# Patient Record
Sex: MALE | Race: OTHER | HISPANIC OR LATINO | Employment: FULL TIME | URBAN - METROPOLITAN AREA
[De-identification: names, ages, dates, MRNs, and addresses within clinical notes are randomized per-mention and may not be internally consistent; named-entity substitution may affect disease eponyms.]

---

## 2017-01-09 ENCOUNTER — HOSPITAL ENCOUNTER (EMERGENCY)
Facility: HOSPITAL | Age: 32
Discharge: HOME | End: 2017-01-09
Admitting: EMERGENCY MEDICINE
Payer: COMMERCIAL

## 2017-01-09 VITALS
OXYGEN SATURATION: 97 % | SYSTOLIC BLOOD PRESSURE: 141 MMHG | WEIGHT: 249.12 LBS | TEMPERATURE: 98.1 F | DIASTOLIC BLOOD PRESSURE: 73 MMHG | HEART RATE: 105 BPM | RESPIRATION RATE: 20 BRPM

## 2017-01-09 DIAGNOSIS — J11.1 INFLUENZA-LIKE ILLNESS: Primary | ICD-10-CM

## 2017-01-09 LAB
FLUAV AG SPEC QL IA: NEGATIVE
FLUBV AG SPEC QL IA: NEGATIVE

## 2017-01-09 PROCEDURE — 87798 DETECT AGENT NOS DNA AMP: CPT | Performed by: PHYSICIAN ASSISTANT

## 2017-01-09 PROCEDURE — 99283 EMERGENCY DEPT VISIT LOW MDM: CPT

## 2017-01-09 PROCEDURE — 87400 INFLUENZA A/B EACH AG IA: CPT | Performed by: PHYSICIAN ASSISTANT

## 2017-01-09 RX ORDER — IBUPROFEN 600 MG/1
600 TABLET ORAL ONCE
Status: COMPLETED | OUTPATIENT
Start: 2017-01-09 | End: 2017-01-09

## 2017-01-09 RX ORDER — OSELTAMIVIR PHOSPHATE 75 MG/1
75 CAPSULE ORAL EVERY 12 HOURS SCHEDULED
Qty: 10 CAPSULE | Refills: 0 | Status: SHIPPED | OUTPATIENT
Start: 2017-01-09 | End: 2017-01-14

## 2017-01-09 RX ADMIN — IBUPROFEN 600 MG: 600 TABLET ORAL at 20:38

## 2017-01-10 LAB
FLUAV AG SPEC QL: NORMAL
FLUBV AG SPEC QL: NORMAL
RSV B RNA SPEC QL NAA+PROBE: NORMAL

## 2017-05-06 ENCOUNTER — APPOINTMENT (EMERGENCY)
Dept: RADIOLOGY | Facility: HOSPITAL | Age: 32
End: 2017-05-06
Payer: OTHER MISCELLANEOUS

## 2017-05-06 ENCOUNTER — HOSPITAL ENCOUNTER (EMERGENCY)
Facility: HOSPITAL | Age: 32
Discharge: HOME | End: 2017-05-06
Payer: OTHER MISCELLANEOUS

## 2017-05-06 VITALS
WEIGHT: 260.14 LBS | DIASTOLIC BLOOD PRESSURE: 59 MMHG | HEIGHT: 70 IN | OXYGEN SATURATION: 97 % | HEART RATE: 93 BPM | SYSTOLIC BLOOD PRESSURE: 114 MMHG | TEMPERATURE: 97.7 F | BODY MASS INDEX: 37.24 KG/M2

## 2017-05-06 DIAGNOSIS — S83.91XA RIGHT KNEE SPRAIN: Primary | ICD-10-CM

## 2017-05-06 PROCEDURE — 73564 X-RAY EXAM KNEE 4 OR MORE: CPT

## 2017-05-06 PROCEDURE — 99283 EMERGENCY DEPT VISIT LOW MDM: CPT

## 2017-05-06 RX ORDER — IBUPROFEN 600 MG/1
600 TABLET ORAL EVERY 6 HOURS PRN
Qty: 30 TABLET | Refills: 0 | Status: SHIPPED | OUTPATIENT
Start: 2017-05-06 | End: 2018-01-30

## 2017-05-12 ENCOUNTER — APPOINTMENT (OUTPATIENT)
Dept: OCCUPATIONAL MEDICINE | Facility: CLINIC | Age: 32
End: 2017-05-12
Payer: OTHER MISCELLANEOUS

## 2017-05-12 PROCEDURE — 99213 OFFICE O/P EST LOW 20 MIN: CPT

## 2017-05-19 ENCOUNTER — APPOINTMENT (OUTPATIENT)
Dept: OCCUPATIONAL MEDICINE | Facility: CLINIC | Age: 32
End: 2017-05-19
Payer: OTHER MISCELLANEOUS

## 2017-05-19 PROCEDURE — 99213 OFFICE O/P EST LOW 20 MIN: CPT

## 2017-05-23 ENCOUNTER — APPOINTMENT (OUTPATIENT)
Dept: OCCUPATIONAL MEDICINE | Facility: CLINIC | Age: 32
End: 2017-05-23
Payer: OTHER MISCELLANEOUS

## 2017-05-23 PROCEDURE — 99213 OFFICE O/P EST LOW 20 MIN: CPT

## 2018-01-30 ENCOUNTER — HOSPITAL ENCOUNTER (EMERGENCY)
Facility: HOSPITAL | Age: 33
Discharge: HOME | End: 2018-01-30
Attending: EMERGENCY MEDICINE | Admitting: EMERGENCY MEDICINE
Payer: COMMERCIAL

## 2018-01-30 VITALS
OXYGEN SATURATION: 97 % | HEART RATE: 98 BPM | DIASTOLIC BLOOD PRESSURE: 92 MMHG | TEMPERATURE: 98.5 F | SYSTOLIC BLOOD PRESSURE: 140 MMHG | RESPIRATION RATE: 18 BRPM

## 2018-01-30 DIAGNOSIS — K29.70 GASTRITIS: ICD-10-CM

## 2018-01-30 DIAGNOSIS — R11.10 VOMITING AND DIARRHEA: Primary | ICD-10-CM

## 2018-01-30 DIAGNOSIS — R19.7 VOMITING AND DIARRHEA: Primary | ICD-10-CM

## 2018-01-30 PROCEDURE — 99283 EMERGENCY DEPT VISIT LOW MDM: CPT

## 2018-01-30 RX ORDER — DICYCLOMINE HCL 20 MG
20 TABLET ORAL 2 TIMES DAILY
Qty: 10 TABLET | Refills: 0 | Status: SHIPPED | OUTPATIENT
Start: 2018-01-30 | End: 2021-04-20

## 2018-01-30 RX ORDER — ONDANSETRON 4 MG/1
4 TABLET, ORALLY DISINTEGRATING ORAL EVERY 8 HOURS PRN
Qty: 10 TABLET | Refills: 0 | Status: SHIPPED | OUTPATIENT
Start: 2018-01-30 | End: 2018-02-04 | Stop reason: ALTCHOICE

## 2018-01-30 RX ORDER — MAGNESIUM HYDROXIDE/ALUMINUM HYDROXICE/SIMETHICONE 120; 1200; 1200 MG/30ML; MG/30ML; MG/30ML
30 SUSPENSION ORAL ONCE
Status: COMPLETED | OUTPATIENT
Start: 2018-01-30 | End: 2018-01-30

## 2018-01-30 RX ORDER — FAMOTIDINE 20 MG/1
20 TABLET, FILM COATED ORAL 2 TIMES DAILY
Qty: 20 TABLET | Refills: 0 | Status: SHIPPED | OUTPATIENT
Start: 2018-01-30 | End: 2021-04-20 | Stop reason: ALTCHOICE

## 2018-01-30 RX ORDER — ONDANSETRON 4 MG/1
4 TABLET, ORALLY DISINTEGRATING ORAL ONCE
Status: COMPLETED | OUTPATIENT
Start: 2018-01-30 | End: 2018-01-30

## 2018-01-30 RX ADMIN — ONDANSETRON 4 MG: 4 TABLET, ORALLY DISINTEGRATING ORAL at 17:46

## 2018-01-30 RX ADMIN — LIDOCAINE HYDROCHLORIDE 15 ML: 20 SOLUTION ORAL; TOPICAL at 17:47

## 2018-01-30 RX ADMIN — ALUMINUM HYDROXIDE, MAGNESIUM HYDROXIDE, AND SIMETHICONE 30 ML: 200; 200; 20 SUSPENSION ORAL at 17:47

## 2018-01-30 NOTE — ED PROVIDER NOTES
History  Chief Complaint   Patient presents with    Vomiting     Patient reports multiple episodes of vomiting since yesterday  Also complains of diarrhea and subjective fevers  History provided by:  Patient   used: No    Vomiting   Severity:  Moderate  Duration:  12 hours  Timing:  Intermittent  Quality:  Stomach contents  Able to tolerate:  Liquids  Progression:  Improving  Chronicity:  New  Recent urination:  Normal  Context: not post-tussive and not self-induced    Relieved by:  Nothing  Worsened by:  Nothing  Ineffective treatments:  None tried  Associated symptoms: abdominal pain and diarrhea    Associated symptoms: no chills, no cough, no fever, no headaches and no sore throat    Abdominal pain:     Location:  Epigastric    Quality: cramping      Severity:  Moderate    Onset quality:  Gradual    Duration:  12 hours    Timing:  Intermittent    Progression:  Waxing and waning    Chronicity:  New  Diarrhea:     Quality:  Semi-solid    Severity:  Moderate    Duration:  12 hours    Timing:  Intermittent    Progression:  Improving  Risk factors: suspect food intake    Risk factors: no prior abdominal surgery        None       History reviewed  No pertinent past medical history  Past Surgical History:   Procedure Laterality Date    NO PAST SURGERIES         History reviewed  No pertinent family history  I have reviewed and agree with the history as documented  Social History   Substance Use Topics    Smoking status: Never Smoker    Smokeless tobacco: Not on file    Alcohol use No        Review of Systems   Constitutional: Negative for activity change, chills and fever  HENT: Negative for facial swelling, sore throat and trouble swallowing  Eyes: Negative for pain and visual disturbance  Respiratory: Negative for cough, chest tightness and shortness of breath  Cardiovascular: Negative for chest pain and leg swelling     Gastrointestinal: Positive for abdominal pain, diarrhea and vomiting  Negative for blood in stool and nausea  Genitourinary: Negative for dysuria and flank pain  Musculoskeletal: Negative for back pain, neck pain and neck stiffness  Skin: Negative for pallor and rash  Allergic/Immunologic: Negative for environmental allergies and immunocompromised state  Neurological: Negative for dizziness and headaches  Hematological: Negative for adenopathy  Does not bruise/bleed easily  Psychiatric/Behavioral: Negative for agitation and behavioral problems  All other systems reviewed and are negative  Physical Exam  ED Triage Vitals   Temperature Pulse Respirations Blood Pressure SpO2   01/30/18 1638 01/30/18 1638 01/30/18 1638 01/30/18 1638 01/30/18 1638   98 5 °F (36 9 °C) (!) 115 18 144/64 96 %      Temp Source Heart Rate Source Patient Position - Orthostatic VS BP Location FiO2 (%)   01/30/18 1638 01/30/18 1638 01/30/18 1638 01/30/18 1751 --   Oral Monitor Sitting Right arm       Pain Score       01/30/18 1638       8           Orthostatic Vital Signs  Vitals:    01/30/18 1638 01/30/18 1751   BP: 144/64 140/92   Pulse: (!) 115 98   Patient Position - Orthostatic VS: Sitting Lying       Physical Exam   Constitutional: He is oriented to person, place, and time  He appears well-developed and well-nourished  No distress  HENT:   Head: Normocephalic and atraumatic  Eyes: EOM are normal    Neck: Normal range of motion  Neck supple  Cardiovascular: Normal rate, regular rhythm, normal heart sounds and intact distal pulses  Pulmonary/Chest: Effort normal and breath sounds normal    Abdominal: Soft  Bowel sounds are normal  There is tenderness (mild epigastrium)  There is no rebound and no guarding  Musculoskeletal: Normal range of motion  Neurological: He is alert and oriented to person, place, and time  Skin: Skin is warm and dry  Psychiatric: He has a normal mood and affect  Nursing note and vitals reviewed        ED Medications  Medications   ondansetron (ZOFRAN-ODT) dispersible tablet 4 mg (4 mg Oral Given 1/30/18 1746)   aluminum-magnesium hydroxide-simethicone (MYLANTA) 200-200-20 mg/5 mL oral suspension 30 mL (30 mL Oral Given 1/30/18 1747)   lidocaine viscous (XYLOCAINE) 2 % mucosal solution 15 mL (15 mL Swish & Swallow Given 1/30/18 1747)       Diagnostic Studies  Results Reviewed     None                 No orders to display              Procedures  Procedures       Phone Contacts  ED Phone Contact    ED Course  ED Course                                MDM  Number of Diagnoses or Management Options  Gastritis: new and does not require workup  Vomiting and diarrhea: new and does not require workup  Diagnosis management comments: Patient is a 54-year-old healthy male, comes in with complaints of vomiting, diarrhea, epigastric pain, started around 4:00 a m  today, eat rice and last night, thinks that it might have caused the symptoms; denies fever, blood in stool or vomiting, no chest pain or dyspnea  Past history of GERD  No previous surgeries  Patient is alert, oriented, well-appearing, hemodynamically stable vital signs noted; Lungs clear to auscultation bilaterally; Cardiovascular normal heart sounds:  Normal, equal pulses bilaterally; Abdomen: soft, mild tenderness epigastrium, nondistended, bowel sounds present; Neuro: normal cranial nerve, motor and sensory exam, no focal deficits; no neck stiffness; Extremities: no calf swelling or tenderness, neurovascular intact distally  Impression:  Gastritis, GERD, viral GI illness  Patient is hemodynamically stable, well-appearing, did offer IV medications, but wants to try oral meds, will give Zofran ODT, Maalox, lidocaine, p o  trial, discharge on Pepcid and Zofran and bentyl      CritCare Time    Disposition  Final diagnoses:   Vomiting and diarrhea   Gastritis     Time reflects when diagnosis was documented in both MDM as applicable and the Disposition within this note Time User Action Codes Description Comment    1/30/2018  5:47 PM Danielleakhil Andrzej Add [R11 10,  R19 7] Vomiting and diarrhea     1/30/2018  5:48 PM Zoe Donnelly Add [K29 70] Gastritis       ED Disposition     ED Disposition Condition Comment    Discharge  [facility] discharge to home/self care  Condition at discharge: Stable        Follow-up Information     Follow up With Specialties Details Why Contact Info    Anjum Fontanez MD    [address]  [facility] [address]  [phone]          Discharge Medication List as of 1/30/2018  5:49 PM      START taking these medications    Details   dicyclomine (BENTYL) 20 mg tablet Take 1 tablet (20 mg total) by mouth 2 (two) times a day, Starting Tue 1/30/2018, Print      famotidine (PEPCID) 20 mg tablet Take 1 tablet (20 mg total) by mouth 2 (two) times a day for 10 days, Starting Tue 1/30/2018, Until Fri 2/9/2018, Print      ondansetron (ZOFRAN-ODT) 4 mg disintegrating tablet Take 1 tablet (4 mg total) by mouth every 8 (eight) hours as needed for nausea or vomiting for up to 5 days, Starting Tue 1/30/2018, Until Sun 2/4/2018, Print           No discharge procedures on file      ED Provider  Electronically Signed by           Clifton Stovall MD  01/31/18 8893

## 2018-01-30 NOTE — DISCHARGE INSTRUCTIONS

## 2018-07-14 PROBLEM — Z98.890 H/O RIGHT KNEE SURGERY: Status: ACTIVE | Noted: 2018-07-14
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
## 2018-07-16 ENCOUNTER — OFFICE VISIT (OUTPATIENT)
Dept: FAMILY MEDICINE CLINIC | Facility: CLINIC | Age: 33
End: 2018-07-16
Payer: COMMERCIAL

## 2018-07-16 VITALS
BODY MASS INDEX: 38.8 KG/M2 | HEIGHT: 70 IN | RESPIRATION RATE: 16 BRPM | DIASTOLIC BLOOD PRESSURE: 80 MMHG | HEART RATE: 93 BPM | TEMPERATURE: 96.8 F | SYSTOLIC BLOOD PRESSURE: 126 MMHG | WEIGHT: 271 LBS | OXYGEN SATURATION: 97 %

## 2018-07-16 DIAGNOSIS — N52.9 ERECTILE DYSFUNCTION, UNSPECIFIED ERECTILE DYSFUNCTION TYPE: ICD-10-CM

## 2018-07-16 DIAGNOSIS — M54.50 CHRONIC MIDLINE LOW BACK PAIN WITHOUT SCIATICA: ICD-10-CM

## 2018-07-16 DIAGNOSIS — M25.561 CHRONIC PAIN OF RIGHT KNEE: Primary | ICD-10-CM

## 2018-07-16 DIAGNOSIS — G89.29 CHRONIC MIDLINE LOW BACK PAIN WITHOUT SCIATICA: ICD-10-CM

## 2018-07-16 DIAGNOSIS — G89.29 CHRONIC PAIN OF RIGHT KNEE: Primary | ICD-10-CM

## 2018-07-16 PROBLEM — E66.9 OBESITY (BMI 35.0-39.9 WITHOUT COMORBIDITY): Status: ACTIVE | Noted: 2018-07-16

## 2018-07-16 PROCEDURE — 99214 OFFICE O/P EST MOD 30 MIN: CPT | Performed by: INTERNAL MEDICINE

## 2018-07-16 RX ORDER — NAPROXEN 500 MG/1
500 TABLET ORAL 2 TIMES DAILY WITH MEALS
Qty: 60 TABLET | Refills: 1 | Status: SHIPPED | OUTPATIENT
Start: 2018-07-16 | End: 2021-04-20 | Stop reason: ALTCHOICE

## 2018-07-16 RX ORDER — METHYLPREDNISOLONE 4 MG/1
TABLET ORAL
Qty: 21 TABLET | Refills: 0 | Status: SHIPPED | OUTPATIENT
Start: 2018-07-16 | End: 2021-04-20 | Stop reason: ALTCHOICE

## 2018-07-16 NOTE — ASSESSMENT & PLAN NOTE
Patient counseled on dietary changes that can be implemented for weight loss  Will order fasting lipid levels and HBA1C at the follow up visit

## 2018-07-16 NOTE — ASSESSMENT & PLAN NOTE
Lumbar x-ray ordered  Patient prescribed a course of steroids and advised to take Naproxen as needed

## 2018-07-16 NOTE — PATIENT INSTRUCTIONS
Control del peso   CUIDADO AMBULATORIO:   Por qué es importante controlar gonzalez peso:  Hyla Ale sobrepeso aumenta gonzalez riesgo de presentar problemas de man john enfermedades del corazón, hipertensión, diabetes tipo 2 y ciertos tipos de cáncer  También puede aumentar gonzalez riesgo de presentar osteoartritis, apnea del sueño y otros problemas respiratorios  Trate de bajar de peso de forma gradual y [place]  Incluso ric mínima pérdida de peso puede disminuir gonzalez riesgo de problemas de [place]  Cómo bajar de peso de Carie Lint forma munoz:  Franci Najjar forma munoz y saludable para perder peso es consumir menos calorías y realizar ric actividad física en forma regular  Usted puede perder hasta 1 yesika por semana al reducir el consumo de 500 calorías cada día  Usted puede reducir el consumo de calorías al comer porciones más pequeñas o eliminar los alimentos con alto contenido de calorías  Coral las etiquetas para determinar la cantidad de calorías que contienen los alimentos que consume  También puede quemar calorías al realizar ejercicio john caminar, nadar o montar en bicicleta  Es más probable que usted [employer] peso si hace de estos cambios parte de gonzalez estilo de lucy  Plan de alimentación saludable para el control del peso:  Un plan de alimentación saludable incluye ric variedad de alimentos, contiene más pocas calorías y lo ayuda a estar saludable  Un plan de alimentación saludable incluye lo siguiente:  · Consuma alimentos de grano integral con más frecuencia  Un plan de alimentación saludable debe contener alimentos con fibra  La fibra es la parte de las frutas, verduras y granos que gonzalez cuerpo no puede digerir  Los alimentos de granos integrales son saludables y suministran fibra adicional a gonzalez Nilay [place]  Algunos ejemplos de alimentos de granos integrales son los panes integrales, pastas integrales, la renee, el arroz integral y anne de bulgur  · Consuma ric variedad de verduras todos los ra    [address] las espinacas, coliflor, col ronald y [place]  Coma verduras anaranjadas john las zanahorias, nayeli dulces y calabaza de invierno  · Consuma ric variedad de frutas todos los ra  Escoja frutas frescas o enlatadas en gonzalez propio jugo o con jugo bajo en [place] en vez de jugo  El Tajikistranjit de frutas tiene [place] o kurt nada de [place]  · Consuma productos lácteos con bajo contenido de [place]  Reyes Católicos 85 de 1%  Consuma yogur descremado y requesón (cottage) semidescremado  Trate de consumir quesos descremados john el queso mozzarela y otros quesos semidescremado  · Seleccione don y otros alimentos con proteínas bajos en grasa  Escoja frijoles u [address]  Seleccione pescado, carne de aves sin piel (john el rubia o [place]), cosby de carne [place] (de res o de cerdo)  Antes de cocinar las don o las aves maddy cualquier parte de grasa visible  · Use menos grasas y aceites  Trate de hornear los alimentos en lugar de freírlos  Agregue a las [address] [facility], john [place], crema [place], condimentos para [place] y [facility]  Consuma menos alimentos de alto tenor graso  Coma menos alimentos altos en grasa john las nayeli fritas, donas, helados y pasteles  · Consuma menos dulces  Limite los alimentos y las bebidas con un gran contenido de azúcar  Estos incluyen los caramelos, galletas, gaseosas normales y bebidas endulzadas  Formas de reducir las calorías:   · [address] [facility] porciones  ¨ Use platos pequeños para servirse porciones pequeñas  ¨ No coma segundas porciones  ¨ Cuando coma en un restaurante, pida ric caja y guarde en oswaldo la mitad de la comida antes de empezar a comer  ¨ Comparta con alguien un plato de entrada  · Reemplace los bocadillos o meriendas altos en calorías por los saludables de menos calorías       ¨ Escoja frutas frescas, verduras, galletas de arroz bajo en grasa o palomitas de maíz en lugar de comer nayeli fritas de paquete, nueces o dulces de chocolate  ¨ [place] agua o bebidas dietéticas en lugar de las endulzadas  · Coma kaela comidas regularmente  No omita ninguna comida porque esto puede conducir a comer más a ric hora más tarde del día  Si no tiene tiempo para hacer comidas regulares, consuma un refrigerio saludable  · No vaya al ayala cuando tenga hambre  Usted podría ser más propenso a elegir alimentos no saludables  Lleve ric lista de alimentos saludables y vaya de compras después de brian comido  Ejercicio:  Realice ric actividad física por lo menos 30 minutos al día, la mayoría de los días de la [place]  Algunos de los ejercicios incluyen caminar, montar en bicicleta, bailar y nadar  Usted también puede realizar más actividad física usando las escaleras en vez de los ascensores o estacionarse más lejos cuando Cleta Coon a las tiendas  Pregunte a yost médico acerca del mejor plan de ejercicio para usted  Otras cosas que debe tener en cuenta mientras trata de bajar de peso:   · Esté consciente de las situaciones que podrían ocasionarle ganas de comer en exceso, john el comer mientras michelle la televisión  Busque formas para evitar estas situaciones  Por ejemplo, leer un libro, caminar o hacer trabajos manuales  · Reúnase con un david de apoyo o con personas que también están tratando de bajar de [place]  Young le puede ayudar a mantenerse motivado y continuar progresando en yost objetivo de perder peso  © 2017 [address] Information is for End User's use only and may not be sold, redistributed or otherwise used for commercial purposes  All illustrations and images included in CareNotes® are the copyrighted property of A D A M , Inc  or Jayy Guzman  Esta información es sólo para uso en educación  Yost intención no es darle un consejo médico sobre enfermedades o tratamientos   Colsulte con yost Chago Ahle farmacéutico antes de seguir cualquier régimen médico para saber si es seguro y efectivo para usted  Ejercicios para fortalecer los músculos del tronco   CUIDADO AMBULATORIO:   Lo que debe saber sobre los ejercicios para fortalecer los músculos del tronco:  El tronco incluye los músculos de la parte baja de la espalda, la cadera, la pelvis y el abdomen  Los ejercicios para fortalecer los músculos del tronco ayudan a sanar y fortalecer estos músculos  Aguilar ayuda a prevenir otra lesión y [place] pelvis, la columna vertebral y la cadera en la posición correcta  Pregúntele a gonzalez [place] vitaminas y minerales son adecuados para usted  · Tiene dolor mckenna o creciente cuando hace ejercico o está en reposo  · Tiene preguntas o inquietudes acerca de los ejercicios de hombros   Consejos de seguridad:  Consulte a gonzalez médico antes de empezar un régimen de ejercicios  Un fisioterapeuta puede enseñarle a hacer ejercicios para fortalecer los músculos del tronco de forma munoz  · [employer] ejercicios sobre ric colchoneta o superficie firme  Ric superficie sostendrá la columna vertebral y evitará el dolor de espalda  No jerald estos ejercicios en ric cama  · Muévase lenta y suavemente  Evite movimientos rápidos o bruscos  · Deténgase si siente dolor  Los ejercicios para fortalecer los músculos del tronco no deben ser dolorosos  Deténgase si siente dolor  · Respire normalmente weston los ejercicios  No contenga la respiración  Aguilar puede aumentar la presión arterial y evitar el fortalecimiento muscular  Gonzalez médico le dirá cuándo inhalar y exhalar weston el ejercicio  · Comience todos kaela ejercicios con tonificación abdominal   La tonificación abdominal ayuda a calentar los músculos del tronco  Usted también puede practicar la tonificación o fortalecimiento abdominal weston todo el día mientras está sentado o de pie  Acuéstese boca arriba con kaela rodillas dobladas y kaela pies planos sobre el piso  Coloque kaela brazos en ric posición relajada junto a gonzalez cuerpo   Ponga tensos kaela músculos abdominales  Contraiga el ombligo hacia adentro en dirección a la columna  Sostenga está posición por 5 segundos  Relaje kaela músculos  Repita 10 veces  Ejercicios para fortalecer los músculos del tronco:  Gonzalez médico le indicará con que frecuencia hacer estos ejercicios  También le dirá cuántas repeticiones de cada ejercicio debe hacer  Reuben cada ejercicio weston 5 segundos o según lo indicado  A medida que se fortalezca, aumente a 10 a 15 segundos  Puede hacer algunos de estos ejercicios sobre ric pelota de estabilidad o con un peso  Pregunte a gonzalez médico cómo utilizar ric pelota de estabilidad o un peso para estos ejercicios:  · Vivas lateral con pierna doblada:  Acuéstese de lado con kaela piernas, caderas y hombros en línea recta  Apóyese en gonzalez antebrazo a fin de que gonzalez codo esté directamente debajo de gonzalez hombro  Doble las rodillas hasta 90°  Levante las caderas del suelo  Balancéese sobre el antebrazo y sobre el lado de la rodilla  Mantenga esta posición  Repita en el otro lado  · Vivas lateral con pierna estirada:  Acuéstese de lado con kaela piernas, caderas y hombros en línea recta  Apóyese en gonzalez antebrazo a fin de que gonzalez codo esté directamente debajo de gonzalez hombro  Puede apoyar la pierna de arriba frente a la pierna de abajo para el apoyo  Levante las caderas del suelo  Balancéese sobre el antebrazo y sobre la parte externa del pie flexionado  No permita que gonzalez tobillo se doble de lado  Mantenga esta posición  Repita en el otro lado  · Superman:  Acuéstese boca abajo  Extienda los brazos hacia adelante en el piso  Apriete los músculos abdominales y levante la mano derecha y la pierna izquierda del suelo  Mantenga esta posición  Despacio regrese a la posición inicial  Apriete los músculos abdominales y levante la mano izquierda y la pierna derecha del suelo  Mantenga esta posición   Despacio regrese a la posición inicial            · Posición de acurrucarse:  Acuéstese boca arriba con orly rodillas dobladas y orly pies planos sobre el piso  Coloque las leonor con las alexsandra hacia abajo por debajo de la parte baja de gonzalez espalda  Después, con los codos [facility] [employer], levante los hombros y el pecho de 2 a 3 pulgadas del piso  Mantenga gonzalez alvin a la misma altura de orly hombros  Mantenga esta posición  Despacio regrese a la posición inicial            · Levantamiento de la pierna estirada:  Acuéstese boca arriba con ric pierna estirada  Doble la otra rodilla y coloque el pie en posición plana sobre el piso  Ponga tensos orly músculos abdominales  Mantenga la pierna recta y levántela lentamente de 6 a 12 pulgadas del piso  Mantenga esta posición  Baje gonzalez pierna lentamente  Reuben tantas repeticiones john le indicaron de anton lado  Repita el ejercicio con la otra pierna  · Tablón:  Acuéstese boca abajo  Doble los codos y [employer] antebrazos en posición plana sobre el suelo  Levante gonzalez pecho, el estómago y las rodillas del suelo  Asegúrese de [facility] [employer] codos estén por debajo de los hombros  Gonzalez cuerpo debe estar en línea recta  No deje que las caderas o la parte baja de la espalda se hundan hacia el suelo  Contraiga los músculos abdominales y sostenga weston 15 segundos  Para hacer anton ejercicio más difícil, sostenga weston 30 segundos o levante 1 pierna a la vez  · Bicicletas:  Acuéstese boca arriba  Doble ambas rodillas y llévelas hacia gonzalez pecho  Orly pantorrillas deben estar paralelas al [employer]  [address] [facility] en la parte posterior de la alvin  Estire la pierna derecha y manténgala levantada 2 pulgadas del piso  Levante la alvin y los hombros del piso y gire hacia la izquierda  Mantenga la alvin y los hombros levantados  Doble la rodilla derecha mientras endereza la pierna izquierda  Mantenga la pierna izquierda 2 pulgadas sobre el piso  Gire la alvin y el pecho hacia la pierna izquierda  Siga enderezando 1 pierna a la vez y gire         Crystal Son a orly consultas de control con yost médico según le indicaron  Anote kaela preguntas para que se acuerde de hacerlas weston kaela visitas  © 2017 [address] Information is for End User's use only and may not be sold, redistributed or otherwise used for commercial purposes  All illustrations and images included in CareNotes® are the copyrighted property of A D A M , Inc  or Jayy Guzman  Esta información es sólo para uso en educación  Yost intención no es darle un consejo médico sobre enfermedades o tratamientos  Colsulte con yost Arnoldo Carmine farmacéutico antes de seguir cualquier régimen médico para saber si es seguro y efectivo para usted

## 2018-07-16 NOTE — LETTER
July 16, 2018     Patient: Angelo Farias   YOB: 1985   Date of Visit: 7/16/2018       To Whom it May Concern:    Angelo Farias is under my professional care  He was seen in my office on 7/16/2018  He may return to work on 07/17/2018  If you have any questions or concerns, please don't hesitate to call           Sincerely,          Casey Sims MD        CC: No Recipients

## 2018-07-23 ENCOUNTER — TRANSCRIBE ORDERS (OUTPATIENT)
Dept: ADMINISTRATIVE | Facility: HOSPITAL | Age: 33
End: 2018-07-23

## 2018-07-23 ENCOUNTER — TELEPHONE (OUTPATIENT)
Dept: FAMILY MEDICINE CLINIC | Facility: CLINIC | Age: 33
End: 2018-07-23

## 2018-07-24 ENCOUNTER — TELEPHONE (OUTPATIENT)
Dept: FAMILY MEDICINE CLINIC | Facility: CLINIC | Age: 33
End: 2018-07-24

## 2018-07-24 ENCOUNTER — HOSPITAL ENCOUNTER (OUTPATIENT)
Dept: RADIOLOGY | Facility: HOSPITAL | Age: 33
Discharge: HOME | End: 2018-07-24
Payer: COMMERCIAL

## 2018-07-24 DIAGNOSIS — M54.50 CHRONIC MIDLINE LOW BACK PAIN WITHOUT SCIATICA: ICD-10-CM

## 2018-07-24 DIAGNOSIS — G89.29 CHRONIC MIDLINE LOW BACK PAIN WITHOUT SCIATICA: ICD-10-CM

## 2018-07-24 PROCEDURE — 72110 X-RAY EXAM L-2 SPINE 4/>VWS: CPT

## 2018-08-05 ENCOUNTER — APPOINTMENT (EMERGENCY)
Dept: RADIOLOGY | Facility: HOSPITAL | Age: 33
End: 2018-08-05
Payer: OTHER MISCELLANEOUS

## 2018-08-05 ENCOUNTER — HOSPITAL ENCOUNTER (EMERGENCY)
Facility: HOSPITAL | Age: 33
Discharge: HOME | End: 2018-08-05
Admitting: EMERGENCY MEDICINE
Payer: OTHER MISCELLANEOUS

## 2018-08-05 VITALS
TEMPERATURE: 98.3 F | WEIGHT: 268.5 LBS | RESPIRATION RATE: 18 BRPM | SYSTOLIC BLOOD PRESSURE: 130 MMHG | DIASTOLIC BLOOD PRESSURE: 68 MMHG | OXYGEN SATURATION: 97 % | BODY MASS INDEX: 38.53 KG/M2 | HEART RATE: 89 BPM

## 2018-08-05 DIAGNOSIS — M25.561 CHRONIC PAIN OF RIGHT KNEE: ICD-10-CM

## 2018-08-05 DIAGNOSIS — M25.562 KNEE PAIN, LEFT: ICD-10-CM

## 2018-08-05 DIAGNOSIS — M25.461 EFFUSION OF RIGHT KNEE: ICD-10-CM

## 2018-08-05 DIAGNOSIS — Z98.890 H/O RIGHT KNEE SURGERY: Primary | ICD-10-CM

## 2018-08-05 DIAGNOSIS — G89.29 CHRONIC PAIN OF RIGHT KNEE: ICD-10-CM

## 2018-08-05 DIAGNOSIS — E66.9 OBESITY (BMI 35.0-39.9 WITHOUT COMORBIDITY): ICD-10-CM

## 2018-08-05 DIAGNOSIS — M25.561 KNEE PAIN, RIGHT: ICD-10-CM

## 2018-08-05 PROCEDURE — 73562 X-RAY EXAM OF KNEE 3: CPT

## 2018-08-05 PROCEDURE — 99283 EMERGENCY DEPT VISIT LOW MDM: CPT

## 2018-08-05 RX ORDER — IBUPROFEN 400 MG/1
400 TABLET ORAL EVERY 6 HOURS PRN
Qty: 30 TABLET | Refills: 0 | Status: SHIPPED | OUTPATIENT
Start: 2018-08-05 | End: 2021-04-20 | Stop reason: ALTCHOICE

## 2018-08-05 RX ORDER — ACETAMINOPHEN 500 MG
500 TABLET ORAL EVERY 6 HOURS PRN
Qty: 30 TABLET | Refills: 0 | Status: SHIPPED | OUTPATIENT
Start: 2018-08-05 | End: 2021-04-20 | Stop reason: ALTCHOICE
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
## 2018-08-05 NOTE — DISCHARGE INSTRUCTIONS
Arthralgia   WHAT YOU NEED TO KNOW:   Arthralgia is pain in one or more joints, with no inflammation  It may be short-term and get better within 6 to 8 weeks  Arthralgia can be an early sign of arthritis  Arthralgia may be caused by a medical condition, such as a hormone disorder or a tumor  It may also be caused by an infection or injury  DISCHARGE INSTRUCTIONS:   Medicines: The following medicines may  be ordered for you:  · Acetaminophen  decreases pain  Ask how much to take and how often to take it  Follow directions  Acetaminophen can cause liver damage if not taken correctly  · NSAIDs  decrease pain and prevent swelling  Ask your healthcare provider which medicine is right for you  Ask how much to take and when to take it  Take as directed  NSAIDs can cause stomach bleeding and kidney problems if not taken correctly  · Pain relief cream  decreases pain  Use this cream as directed  · Take your medicine as directed  Contact your healthcare provider if you think your medicine is not helping or if you have side effects  Tell him of her if you are allergic to any medicine  Keep a list of the medicines, vitamins, and herbs you take  Include the amounts, and when and why you take them  Bring the list or the pill bottles to follow-up visits  Carry your medicine list with you in case of an emergency  Follow up with your healthcare provider or specialist as directed:  Write down your questions so you remember to ask them during your visits  Self-care:   · Apply heat  to help decrease pain  Use a heating pad or heat wrap  Apply heat for 20 to 30 minutes every 2 hours for as many days as directed  · Rest  as much as possible  Avoid activities that cause joint pain  · Apply ice  to help decrease swelling and pain  Ice may also help prevent tissue damage  Use an ice pack, or put crushed ice in a plastic bag   Cover it with a towel and place it on your painful joint for 15 to 20 minutes every hour or as directed  · Support  the joint with a brace or elastic wrap as directed  · Elevate  your joint above the level of your heart as often as you can to help decrease swelling and pain  Prop your painful joint on pillows or blankets to keep it elevated comfortably  · Lose weight  if you are overweight  Extra weight can put pressure on your joints and cause more pain  Ask your healthcare provider how much you should weigh  Ask him to help you create a weight loss plan  · Exercise  regularly to help improve joint movement and to decrease pain  Ask about the best exercise plan for you  Low-impact exercises can help take the pressure off your joints  Examples are walking, swimming, and water aerobics  Physical therapy:  A physical therapist teaches you exercises to help improve movement and strength, and to decrease pain  Ask your healthcare provider if physical therapy is right for you  Contact your healthcare provider or specialist if:   · You have a fever  · You continue to have joint pain that cannot be relieved with heat, ice, or medicine  · You have pain and inflammation around your joint  · You have questions or concerns about your condition or care  Return to the emergency department if:   · You have sudden, severe pain when you move your joint  · You have a fever and shaking chills  · You cannot move your joint  · You lose feeling on the side of your body where you have the painful joint  © 2017 [address] [employer]  Information is for End User's use only and may not be sold, redistributed or otherwise used for commercial purposes  All illustrations and images included in CareNotes® are the copyrighted property of [employer]  or Jayy Guzman  The above information is an  only  It is not intended as medical advice for individual conditions or treatments   Talk to your doctor, nurse or pharmacist before following any medical regimen to see if it is safe and effective for you

## 2018-08-05 NOTE — ED NOTES
Patient reports pain and numbness in bilateral knees  Pt  reports surgery on his right knee about 1 year ago, due to a fall that happened at work "there was a problem with the meniscus "  Patient states that he takes percocet that is prescribed for the pain  Patient's right knee is larger than the left and he states that this is very swollen for him         Venu Wang RN  08/05/18 9191

## 2018-11-22 ENCOUNTER — HOSPITAL ENCOUNTER (EMERGENCY)
Facility: HOSPITAL | Age: 33
Discharge: HOME | End: 2018-11-22
Attending: EMERGENCY MEDICINE | Admitting: EMERGENCY MEDICINE
Payer: COMMERCIAL

## 2018-11-22 VITALS
RESPIRATION RATE: 18 BRPM | TEMPERATURE: 97.5 F | HEART RATE: 95 BPM | OXYGEN SATURATION: 98 % | DIASTOLIC BLOOD PRESSURE: 74 MMHG | SYSTOLIC BLOOD PRESSURE: 131 MMHG

## 2018-11-22 DIAGNOSIS — R21 RASH AND NONSPECIFIC SKIN ERUPTION: Primary | ICD-10-CM

## 2018-11-22 PROCEDURE — 99282 EMERGENCY DEPT VISIT SF MDM: CPT

## 2018-11-22 RX ORDER — PREDNISONE 20 MG/1
40 TABLET ORAL DAILY
Qty: 10 TABLET | Refills: 0 | Status: SHIPPED | OUTPATIENT
Start: 2018-11-22 | End: 2018-11-22

## 2018-11-22 RX ORDER — PREDNISONE 20 MG/1
40 TABLET ORAL ONCE
Status: COMPLETED | OUTPATIENT
Start: 2018-11-22 | End: 2018-11-22

## 2018-11-22 RX ORDER — PREDNISONE 20 MG/1
40 TABLET ORAL DAILY
Qty: 10 TABLET | Refills: 0 | Status: SHIPPED | OUTPATIENT
Start: 2018-11-22 | End: 2018-11-27

## 2018-11-22 RX ORDER — KETOCONAZOLE 20 MG/G
CREAM TOPICAL DAILY
Qty: 15 G | Refills: 0 | Status: SHIPPED | OUTPATIENT
Start: 2018-11-22 | End: 2021-04-20 | Stop reason: ALTCHOICE

## 2018-11-22 RX ADMIN — PREDNISONE 40 MG: 20 TABLET ORAL at 16:37
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
## 2018-11-22 NOTE — DISCHARGE INSTRUCTIONS
Acute Rash   WHAT YOU NEED TO KNOW:   A rash is irritation, redness, or itchiness in the skin or mucus membranes  Mucus membranes are areas such as the lining of your nose or throat  Acute means the rash starts suddenly, worsens quickly, and lasts a short time  An acute rash may be caused by a disease, such as hepatitis or vasculitis  The rash may be a reaction to something you are allergic to, such as certain foods, or latex  Certain medicines, including antibiotics, NSAIDs, prescription pain medicine, and aspirin can also cause a rash  DISCHARGE INSTRUCTIONS:   Return to the emergency department if:   · You have sudden trouble breathing or chest pain  · You are vomiting, have a headache or muscle aches, and your throat hurts  Contact your healthcare provider if:   · You have a fever  · You get open wounds from scratching your skin, or you have a wound that is red, swollen, or painful  · Your rash lasts longer than 3 months  · You have swelling or pain in your joints  · You have questions or concerns about your condition or care  Medicines:  If your rash does not go away on its own, you may need the following medicines:  · Antihistamines  may be given to help decrease itching  · Steroids  may be given to decrease inflammation  · Antibiotics  help fight or prevent a bacterial infection  · Take your medicine as directed  Contact your healthcare provider if you think your medicine is not helping or if you have side effects  Tell him of her if you are allergic to any medicine  Keep a list of the medicines, vitamins, and herbs you take  Include the amounts, and when and why you take them  Bring the list or the pill bottles to follow-up visits  Carry your medicine list with you in case of an emergency  Prevent a rash or care for your skin when you have a rash:  Dry skin can lead to more problems  Do not scratch your skin if it itches  You may cause a skin infection by scratching   The following may prevent dry skin, and help your skin look better:  · Use thick cream lotions or petroleum jelly to help soothe your rash  These products work well on areas with thick skin, such as your feet  Cool compresses may also be used to soothe your skin  Apply a cool compress or a cool, wet towel, and then cover it with a dry towel  · Use lukewarm water when you bathe  Hot water may damage your skin more  Pat your skin dry  Do not rub your skin with a towel  · Use detergents, soaps, shampoos, and bubble baths made for sensitive skin  Wear clothes that are made of cotton instead of nylon or wool  Cotton is softer, so it will not hurt your skin as much  Follow up with your healthcare provider as directed: You may need to see a dermatologist if healthcare providers do not know what is causing your rash  You may also need to see a dermatologist if your rash does not get better even with treatment  You may need to see a dietitian if you have allergies to foods  Write down your questions so you remember to ask them during your visits  © 2017 [address] [facility] Information is for End User's use only and may not be sold, redistributed or otherwise used for commercial purposes  All illustrations and images included in CareNotes® are the copyrighted property of [employer]  or Jayy Guzman  The above information is an  only  It is not intended as medical advice for individual conditions or treatments  Talk to your doctor, nurse or pharmacist before following any medical regimen to see if it is safe and effective for you  Contact Dermatitis   WHAT YOU NEED TO KNOW:   Contact dermatitis is a skin rash  It develops when you touch something that irritates your skin or causes an allergic reaction  DISCHARGE INSTRUCTIONS:   Call 911 for any of the following:   · You have sudden trouble breathing  · Your throat swells and you have trouble eating      · Your face is swollen  Contact your healthcare provider if:   · You have a fever  · Your blisters are draining pus  · Your rash spreads or does not get better, even after treatment  · You have questions or concerns about your condition or care  Medicines:   · Medicines  help decrease itching and swelling  They will be given as a topical medicine to apply to your rash or as a pill  · Take your medicine as directed  Contact your healthcare provider if you think your medicine is not helping or if you have side effects  Tell him or her if you are allergic to any medicine  Keep a list of the medicines, vitamins, and herbs you take  Include the amounts, and when and why you take them  Bring the list or the pill bottles to follow-up visits  Carry your medicine list with you in case of an emergency  Manage contact dermatitis:   · Take short baths or showers in cool water  Use mild soap or soap-free cleansers  Add oatmeal, baking soda, or cornstarch to the bath water to help decrease skin irritation  · Avoid skin irritants , such as makeup, hair products, soaps, and cleansers  Use products that do not contain perfume or dye  · Apply a cool compress to your rash  This will help soothe your skin  · Keep your skin moist   Rub unscented cream or lotion on your skin to prevent dryness and itching  Do this right after a bath or shower when your skin is still damp  Follow up with your healthcare provider or dermatologist in 2 to 3 days:  Write down your questions so you remember to ask them during your visits  © 2017 [address] [employer] Information is for End User's use only and may not be sold, redistributed or otherwise used for commercial purposes  All illustrations and images included in CareNotes® are the copyrighted property of [employer]  or Jayy Guzman  The above information is an  only  It is not intended as medical advice for individual conditions or treatments   Talk to your doctor, nurse or pharmacist before following any medical regimen to see if it is safe and effective for you

## 2018-12-05 ENCOUNTER — APPOINTMENT (EMERGENCY)
Dept: RADIOLOGY | Facility: HOSPITAL | Age: 33
End: 2018-12-05
Payer: COMMERCIAL

## 2018-12-05 ENCOUNTER — HOSPITAL ENCOUNTER (EMERGENCY)
Facility: HOSPITAL | Age: 33
Discharge: HOME | End: 2018-12-05
Attending: EMERGENCY MEDICINE | Admitting: EMERGENCY MEDICINE
Payer: COMMERCIAL

## 2018-12-05 VITALS
BODY MASS INDEX: 35.87 KG/M2 | TEMPERATURE: 99.8 F | SYSTOLIC BLOOD PRESSURE: 111 MMHG | HEART RATE: 114 BPM | DIASTOLIC BLOOD PRESSURE: 65 MMHG | RESPIRATION RATE: 22 BRPM | OXYGEN SATURATION: 93 % | WEIGHT: 250 LBS

## 2018-12-05 DIAGNOSIS — R50.9 FEVER: ICD-10-CM

## 2018-12-05 DIAGNOSIS — J18.9 RIGHT LOWER LOBE PNEUMONIA: Primary | ICD-10-CM

## 2018-12-05 LAB
ALBUMIN SERPL BCP-MCNC: 3.5 G/DL (ref 3.5–5)
ALP SERPL-CCNC: 87 U/L (ref 46–116)
ALT SERPL W P-5'-P-CCNC: 37 U/L (ref 12–78)
ANION GAP SERPL CALCULATED.3IONS-SCNC: 13 MMOL/L (ref 4–13)
AST SERPL W P-5'-P-CCNC: 20 U/L (ref 5–45)
BACTERIA UR QL AUTO: ABNORMAL /HPF
BASOPHILS # BLD AUTO: 0.08 THOUSANDS/ΜL (ref 0–0.1)
BASOPHILS NFR BLD AUTO: 1 % (ref 0–1)
BILIRUB SERPL-MCNC: 0.54 MG/DL (ref 0.2–1)
BILIRUB UR QL STRIP: NEGATIVE
BUN SERPL-MCNC: 14 MG/DL (ref 5–25)
CALCIUM SERPL-MCNC: 8.9 MG/DL (ref 8.3–10.1)
CHLORIDE SERPL-SCNC: 99 MMOL/L (ref 100–108)
CLARITY UR: CLEAR
CO2 SERPL-SCNC: 23 MMOL/L (ref 21–32)
COLOR UR: YELLOW
CREAT SERPL-MCNC: 0.88 MG/DL (ref 0.6–1.3)
EOSINOPHIL # BLD AUTO: 0.01 THOUSAND/ΜL (ref 0–0.61)
EOSINOPHIL NFR BLD AUTO: 0 % (ref 0–6)
ERYTHROCYTE [DISTWIDTH] IN BLOOD BY AUTOMATED COUNT: 13.3 % (ref 11.6–15.1)
GFR SERPL CREATININE-BSD FRML MDRD: 112 ML/MIN/1.73SQ M
GLUCOSE SERPL-MCNC: 94 MG/DL (ref 65–140)
GLUCOSE UR STRIP-MCNC: NEGATIVE MG/DL
HCT VFR BLD AUTO: 42.6 % (ref 36.5–49.3)
HGB BLD-MCNC: 13.9 G/DL (ref 12–17)
HGB UR QL STRIP.AUTO: ABNORMAL
IMM GRANULOCYTES # BLD AUTO: 0.15 THOUSAND/UL (ref 0–0.2)
IMM GRANULOCYTES NFR BLD AUTO: 1 % (ref 0–2)
KETONES UR STRIP-MCNC: ABNORMAL MG/DL
LEUKOCYTE ESTERASE UR QL STRIP: NEGATIVE
LYMPHOCYTES # BLD AUTO: 1.59 THOUSANDS/ΜL (ref 0.6–4.47)
LYMPHOCYTES NFR BLD AUTO: 10 % (ref 14–44)
MCH RBC QN AUTO: 28.6 PG (ref 26.8–34.3)
MCHC RBC AUTO-ENTMCNC: 32.6 G/DL (ref 31.4–37.4)
MCV RBC AUTO: 88 FL (ref 82–98)
MONOCYTES # BLD AUTO: 2.08 THOUSAND/ΜL (ref 0.17–1.22)
MONOCYTES NFR BLD AUTO: 14 % (ref 4–12)
MUCOUS THREADS UR QL AUTO: ABNORMAL
NEUTROPHILS # BLD AUTO: 11.41 THOUSANDS/ΜL (ref 1.85–7.62)
NEUTS SEG NFR BLD AUTO: 74 % (ref 43–75)
NITRITE UR QL STRIP: NEGATIVE
NON-SQ EPI CELLS URNS QL MICRO: ABNORMAL /HPF
NRBC BLD AUTO-RTO: 0 /100 WBCS
PH UR STRIP.AUTO: 6 [PH] (ref 4.5–8)
PLATELET # BLD AUTO: 278 THOUSANDS/UL (ref 149–390)
PMV BLD AUTO: 11 FL (ref 8.9–12.7)
POTASSIUM SERPL-SCNC: 3.7 MMOL/L (ref 3.5–5.3)
PROT SERPL-MCNC: 7.9 G/DL (ref 6.4–8.2)
PROT UR STRIP-MCNC: NEGATIVE MG/DL
RBC # BLD AUTO: 4.86 MILLION/UL (ref 3.88–5.62)
RBC #/AREA URNS AUTO: ABNORMAL /HPF
SODIUM SERPL-SCNC: 135 MMOL/L (ref 136–145)
SP GR UR STRIP.AUTO: 1.01 (ref 1–1.03)
UROBILINOGEN UR QL STRIP.AUTO: 1 E.U./DL
WBC # BLD AUTO: 15.32 THOUSAND/UL (ref 4.31–10.16)
WBC #/AREA URNS AUTO: ABNORMAL /HPF

## 2018-12-05 PROCEDURE — 80053 COMPREHEN METABOLIC PANEL: CPT | Performed by: EMERGENCY MEDICINE

## 2018-12-05 PROCEDURE — 96361 HYDRATE IV INFUSION ADD-ON: CPT

## 2018-12-05 PROCEDURE — 96374 THER/PROPH/DIAG INJ IV PUSH: CPT

## 2018-12-05 PROCEDURE — 85025 COMPLETE CBC W/AUTO DIFF WBC: CPT | Performed by: EMERGENCY MEDICINE

## 2018-12-05 PROCEDURE — 99284 EMERGENCY DEPT VISIT MOD MDM: CPT

## 2018-12-05 PROCEDURE — 36415 COLL VENOUS BLD VENIPUNCTURE: CPT | Performed by: EMERGENCY MEDICINE

## 2018-12-05 PROCEDURE — 96375 TX/PRO/DX INJ NEW DRUG ADDON: CPT

## 2018-12-05 PROCEDURE — 93005 ELECTROCARDIOGRAM TRACING: CPT

## 2018-12-05 PROCEDURE — 71046 X-RAY EXAM CHEST 2 VIEWS: CPT

## 2018-12-05 PROCEDURE — 81001 URINALYSIS AUTO W/SCOPE: CPT

## 2018-12-05 RX ORDER — AZITHROMYCIN 250 MG/1
500 TABLET, FILM COATED ORAL ONCE
Status: COMPLETED | OUTPATIENT
Start: 2018-12-05 | End: 2018-12-05

## 2018-12-05 RX ORDER — ACETAMINOPHEN 325 MG/1
975 TABLET ORAL EVERY 6 HOURS PRN
Status: DISCONTINUED | OUTPATIENT
Start: 2018-12-05 | End: 2018-12-06 | Stop reason: HOSPADM

## 2018-12-05 RX ORDER — ACETAMINOPHEN 325 MG/1
650 TABLET ORAL ONCE
Status: DISCONTINUED | OUTPATIENT
Start: 2018-12-05 | End: 2018-12-05

## 2018-12-05 RX ORDER — ONDANSETRON 2 MG/ML
4 INJECTION INTRAMUSCULAR; INTRAVENOUS ONCE
Status: COMPLETED | OUTPATIENT
Start: 2018-12-05 | End: 2018-12-05

## 2018-12-05 RX ORDER — ONDANSETRON 4 MG/1
4 TABLET, ORALLY DISINTEGRATING ORAL ONCE
Status: DISCONTINUED | OUTPATIENT
Start: 2018-12-05 | End: 2018-12-05

## 2018-12-05 RX ORDER — IBUPROFEN 400 MG/1
800 TABLET ORAL ONCE
Status: DISCONTINUED | OUTPATIENT
Start: 2018-12-05 | End: 2018-12-05

## 2018-12-05 RX ORDER — IBUPROFEN 400 MG/1
400 TABLET ORAL ONCE
Status: DISCONTINUED | OUTPATIENT
Start: 2018-12-05 | End: 2018-12-05

## 2018-12-05 RX ORDER — KETOROLAC TROMETHAMINE 30 MG/ML
30 INJECTION, SOLUTION INTRAMUSCULAR; INTRAVENOUS ONCE
Status: COMPLETED | OUTPATIENT
Start: 2018-12-05 | End: 2018-12-05

## 2018-12-05 RX ORDER — AZITHROMYCIN 250 MG/1
250 TABLET, FILM COATED ORAL EVERY 24 HOURS
Qty: 4 TABLET | Refills: 0 | Status: SHIPPED | OUTPATIENT
Start: 2018-12-05 | End: 2018-12-09

## 2018-12-05 RX ADMIN — KETOROLAC TROMETHAMINE 30 MG: 30 INJECTION, SOLUTION INTRAMUSCULAR at 20:14

## 2018-12-05 RX ADMIN — SODIUM CHLORIDE 1000 ML: 0.9 INJECTION, SOLUTION INTRAVENOUS at 20:15

## 2018-12-05 RX ADMIN — ACETAMINOPHEN 975 MG: 325 TABLET ORAL at 20:13

## 2018-12-05 RX ADMIN — ONDANSETRON 4 MG: 2 INJECTION INTRAMUSCULAR; INTRAVENOUS at 20:14

## 2018-12-05 RX ADMIN — AZITHROMYCIN 500 MG: 250 TABLET, FILM COATED ORAL at 21:23

## 2018-12-05 RX ADMIN — SODIUM CHLORIDE 1000 ML: 0.9 INJECTION, SOLUTION INTRAVENOUS at 20:16

## 2018-12-06 NOTE — DISCHARGE INSTRUCTIONS
Neumonía adquirida en la comunidad   LO QUE NECESITA SABER:   La neumonía adquirida en la comunidad (NAC) es ric infección pulmonar que se contrae fuera de un hospital o de un hogar de ancianos  Kaela pulmones se inflaman y no pueden funcionar de la [place]  La neumonía adquirida en la comunidad puede ser causada por ric bacteria, un virus o un hongo  INSTRUCCIONES SOBRE EL THEO HOSPITALARIA:   Regrese a la christina de emergencias si:   · Usted está confundido y no puede pensar con claridad  · Usted tiene más dificultad para respirar  · Kaela labios o uñas de las leonor se tornan grises o [place]  Pregúntele a gonzalez Glory Rinne vitaminas y minerales son adecuados para usted  · Kaela síntomas no mejoran o Bridgett Areola  · Usted está orinando menos o no [place]  · Usted tiene preguntas o inquietudes acerca de gonzalez condición o cuidado  Medicamentos:   · Medicamentos,  para tratar ric infección bacterial, viral o causada por hongos  También podrían administrarle un medicamento para dilatar kaela bronquios y ayudarle a que respire con mayor facilidad  · [place] kaela medicamentos john se le haya indicado  Consulte con gonzalez médico si usted colin que gonzalez medicamento no le está ayudando o si presenta efectos secundarios  Infórmele si es alérgico a cualquier medicamento  Mantenga ric lista actualizada de los [place], las vitaminas y los productos herbales que cornell  Incluya los siguientes datos de los medicamentos: cantidad, frecuencia y motivo de administración  Traiga con usted la lista o los envases de la píldoras a kaela citas de seguimiento  Lleve la lista de los medicamentos con usted en selin de ric emergencia  Programe ric zbigniew con gonzalez médico dentro de 3 días o según indicaciones:  Es posible que deban tomarle otra radiografía  Anote kaela preguntas para que se acuerde de hacerlas weston akela visitas  Tos y respiración profunda:  La respiración profunda ayuda a abrir las vía aéreas de kaela pulmones   El rachel Fernández a expulsar las flemas de kaela pulmones  Respire hondo y contenga el aliento tanto john pueda  Luego expulse el aire de kaela pulmones con ric tos o expectoración mary jo y profunda  Expectore la flema  [place] 10 inhalaciones profundas seguidas, ric detrás de la [place], cada hora que usted esté despierto  Recuerde toser después de hacer ric inhalación profunda  No fume ni permita que otras personas fumen a gonzalez alrededor:  La nicotina y otras sustancias químicas que contienen los cigarrillos y cigarros pueden dañar los pulmones  Pida información a gonzalez médico si usted actualmente fuma y necesita ayuda para dejar de fumar  Los cigarrillos electrónicos o tabaco sin humo todavía contienen nicotina  Consulte con gonzalez médico antes de [employer]  Mantenga gonzalez neumonía adquirida en la comunidad bajo control en gonzalez hogar:   · Respire aire cálido y húmedo  [place] ayuda a aflojar la flema  Coloque sin presionar ric toalla pequeña tibia y húmeda sobre gonzalez Clydene Signs y gonzalez boca  Un humidificador de [employer] puede poner humedad en el aire  · [address] se le haya indicado  Pregunte a gonzalez médico cuánto líquido debe senait a diario y qué líquidos le recomienda  Los líquidos ayudan a disolver la flema y expulsarla de gonzalez cuerpo  · Dese golpecitos suaves en el pecho  [place] ayuda a aflojar la flema y hace que sea más fácil toser  Acuéstese boca arriba con la alvin más abajo que gonzalez pecho varias veces al día y golpéese con suavidad el pecho  · Descanse lo suficiente  El descanso ayuda a que gonzalez cuerpo se recupere  Prevenir el CAP:   · [place] frecuentemente con agua y Teressa Dove  Lleve un gel antibacterial con usted  Usted puede usar el gel para limpiar kaela leonor cuando no [employer] y agua disponibles  No se toque los ojos, la nariz o la boca a menos que se haya lavado las leonor elis  · Limpie las superficies con frecuencia    [address] marilyn, los muebles de la cocina, teléfonos celulares y otras superficies que la gente toca con frecuencia  · Siempre cubra yost boca al toser  Tosa en un pañuelo desechable o en la manga de yost camisa para no contagiar los gérmenes con orly leonor  · Trate de evitar a las personas que están resfriadas o tienen gripe  Si usted está enfermo, manténgase alejado de otras personas lo más que pueda  · [facility] vacunas  Es posible que usted necesite recibir ric vacuna que sirve para prevenir la neumonía  Acuda a que le apliquen la vacuna de la influenza (gripe) cada año tan pronto john esté disponible  © 2017 [address] Information is for End User's use only and may not be sold, redistributed or otherwise used for commercial purposes  All illustrations and images included in CareNotes® are the copyrighted property of A D A M , Inc  or Jayy Guzman  Esta información es sólo para uso en educación  Yost intención no es darle un consejo médico sobre enfermedades o tratamientos  Colsulte con yost Taty [place] farmacéutico antes de seguir cualquier régimen médico para saber si es seguro y efectivo para usted  Neumonía adquirida en la comunidad   LO QUE NECESITA SABER:   La neumonía adquirida en la comunidad (NAC) es ric infección pulmonar que se contrae fuera de un hospital o de un hogar de ancianos  Orly pulmones se inflaman y no pueden funcionar de la [place]  La neumonía adquirida en la comunidad puede ser causada por ric bacteria, un virus o un hongo  INSTRUCCIONES SOBRE EL THEO HOSPITALARIA:   Busque atención médica de inmediato si:   · Usted está confundido y no puede pensar con claridad  · Usted tiene más dificultad para respirar  · Orly labios o uñas de las leonor se tornan grises o [place]  Pregúntele a yost [place] vitaminas y minerales son adecuados para usted  · Orly síntomas no mejoran o [place]  · Usted está orinando menos o no [facility]      · Usted tiene preguntas o inquietudes acerca de yost Layman Lye  Medicamentos:   · Medicamentos,  para tratar ric infección bacterial, viral o causada por hongos  También podrían administrarle un medicamento para dilatar kaela bronquios y ayudarle a que respire con mayor facilidad  · [place] kaela medicamentos john se le haya indicado  Consulte con gonzalez médico si usted colin que gonzalez medicamento no le está ayudando o si presenta efectos secundarios  Infórmele si es alérgico a cualquier medicamento  Mantenga ric lista actualizada de los [employer], las vitaminas y los productos herbales que cornell  Incluya los siguientes datos de los medicamentos: cantidad, frecuencia y motivo de administración  Traiga con usted la lista o los envases de la píldoras a kaela citas de seguimiento  Lleve la lista de los medicamentos con usted en selin de ric emergencia  Programe ric zbigniew con gonzalez médico dentro de 3 días o según indicaciones:  Es posible que deban tomarle otra radiografía  Anote kaela preguntas para que se acuerde de hacerlas weston kaela visitas  Tos y respiración profunda:  La respiración profunda ayuda a abrir las vía aéreas de kaela pulmones  El toser [place] a [employer] de kaela pulmones  Respire hondo y contenga el aliento tanto john pueda  Luego expulse el aire de kaela pulmones con ric tos o expectoración mary jo y profunda  Expectore la flema  [place] 10 inhalaciones profundas seguidas, ric detrás de la [place], cada hora que usted esté despierto  Recuerde toser después de hacer ric inhalación profunda  No fume ni permita que otras personas fumen a gonzalez alrededor:  La nicotina y otras sustancias químicas que contienen los cigarrillos y cigarros pueden dañar los pulmones  Pida información a gonzalez médico si usted actualmente fuma y necesita ayuda para dejar de fumar  Los cigarrillos electrónicos o tabaco sin humo todavía contienen nicotina  Consulte con gonzalez médico antes de [employer]     Mantenga gonzalez neumonía adquirida en la comunidad bajo control en gonzalez hogar: · Respire aire cálido y húmedo  [place] ayuda a aflojar la flema  Coloque sin presionar ric toalla pequeña tibia y húmeda sobre gonzalez Lorrie [place] and [place] y gonzalez boca  Un humidificador de [employer] puede poner humedad en el aire  · [address] se le haya indicado  Pregunte a gonzalez médico cuánto líquido debe senait a diario y qué líquidos le recomienda  Los líquidos ayudan a disolver la flema y expulsarla de gonzalez cuerpo  · Dese golpecitos suaves en el pecho  [place] ayuda a aflojar la flema y hace que sea más fácil toser  Acuéstese boca arriba con la alvin más abajo que gonzalez pecho varias veces al día y golpéese con suavidad el pecho  · Descanse lo suficiente  El descanso ayuda a que gonzalez cuerpo se recupere  Prevenir el CAP:   · [place] frecuentemente con agua y [place]  Lleve un gel antibacterial con usted  Usted puede usar el gel para limpiar kaela leonor cuando no Lexine Emmons y agua disponibles  No se toque los ojos, la nariz o la boca a menos que se haya lavado las leonor elis  · Limpie las superficies con frecuencia  Limpie las perillas de las marilyn, los muebles de la cocina, teléfonos celulares y otras superficies que la gente toca con frecuencia  · Siempre cubra gonzalez boca al toser  Tosa en un pañuelo desechable o en la manga de gonzalez camisa para no contagiar los gérmenes con kaela leonor  · Trate de evitar a las personas que están resfriadas o tienen gripe  Si usted está enfermo, manténgase alejado de otras personas lo más que pueda  · [place] vacunas  Es posible que usted necesite recibir ric vacuna que sirve para prevenir la neumonía  Acuda a que le apliquen la vacuna de la influenza (gripe) cada año tan pronto john esté disponible  © 2017 [address] Information is for End User's use only and may not be sold, redistributed or otherwise used for commercial purposes   All illustrations and images included in CareNotes® are the copyrighted property of A D A M , Inc  or Jayy Thomas  Esta información es sólo para uso en educación  Gonzalez intención no es darle un consejo médico sobre enfermedades o tratamientos  Colsulte con gonzalez Hannah Shouts farmacéutico antes de seguir cualquier régimen médico para saber si es seguro y efectivo para usted  Fiebre en adultos, cuidados ambulatorios   INFORMACIÓN GENERAL:   La fiebre  es un aumento de la temperatura corporal por encima de los 100 4°F (38°C)  La fiebre puede ser causada por infección, trastornos inflamatorios, o la causa puede ser desconocida  Otros signos y síntomas podrían incluir alguno de los siguientes:   · Escalofríos y estremecimientos    · Rigidez muscular    · Pérdida de peso    · Sudoración nocturna    · Fiebre que viene y va    · Fiebre que es más rodger por las mañanas  Busque atención inmediata para los siguientes síntomas:   · Dificultad para respirar o dolor en el pecho    · Piel, labios o uñas azules    · Rigidez en el primo y dolor de oído    · Fiebre que no se va o empeora incluso después del tratamiento    · Confusión    · [place] sólo cantidades pequeñas o nada en lo absoluto    · Latidos cardíacos más rápidos que lo usual incluso después del tratamiento  El tratamiento para la fiebre  puede incluir medicamentos para bajar la fiebre  También podría necesitar medicamento para tratar ric infección causada por bacteria  Consulte con gonzalez proveedor de man para más información sobre los medicamentos que le receten y cómo debe tomarlos de forma munoz  Controle la fiebre:   · Báñese  con agua tibia o templada  · Envuelva en ric compresa de hielo  en ric toalla o toallita de leonor pequeña y humedecida con agua templada  Coloque la compresa de hielo o la toallita en gonzalez frente o en la parte posterior de gonzalez primo (nuca)  · Consuma líquidos según las indicaciones  Pregunte cuánto líquido debe consumir cada día y cuáles son los mejores líquidos para usted   Los líquidos Evonnie Rob a evitar la deshidratación  Programe ric zbigniew con medina proveedor de [employer] se le haya indicado: Anote kaela preguntas para que se acuerde de hacerlas weston kaela visitas  ACUERDOS SOBRE MEDINA CUIDADO:   Usted tiene el derecho de participar en la planificación de medina cuidado  Aprenda todo lo que pueda sobre medina condición y john darle tratamiento  Discuta con kaela médicos kaela opciones de tratamiento para juntos decidir el cuidado que usted quiere recibir  Usted siempre tiene el derecho a rechazar medina tratamiento  Esta información es sólo para uso en educación  Medina intención no es darle un consejo médico sobre enfermedades o tratamientos  Colsulte con medina Tiffany uDvall farmacéutico antes de seguir cualquier régimen médico para saber si es seguro y efectivo para usted  © 2014 [address] is for End User's use only and may not be sold, redistributed or otherwise used for commercial purposes  All illustrations and images included in CareNotes® are the copyrighted property of A SURYA A HELEN , Inc  or Jayy Guzman
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
## 2018-12-09 LAB
ATRIAL RATE: 125 BPM
P AXIS: 43 DEGREES
PR INTERVAL: 134 MS
QRS AXIS: 107 DEGREES
QRSD INTERVAL: 86 MS
QT INTERVAL: 292 MS
QTC INTERVAL: 421 MS
T WAVE AXIS: 14 DEGREES
VENTRICULAR RATE: 125 BPM

## 2018-12-09 PROCEDURE — 93010 ELECTROCARDIOGRAM REPORT: CPT | Performed by: INTERNAL MEDICINE

## 2019-11-13 ENCOUNTER — HOSPITAL ENCOUNTER (EMERGENCY)
Facility: HOSPITAL | Age: 34
Discharge: HOME | End: 2019-11-14
Attending: EMERGENCY MEDICINE | Admitting: EMERGENCY MEDICINE
Payer: COMMERCIAL

## 2019-11-13 VITALS
RESPIRATION RATE: 18 BRPM | TEMPERATURE: 98.1 F | DIASTOLIC BLOOD PRESSURE: 81 MMHG | HEART RATE: 91 BPM | OXYGEN SATURATION: 100 % | WEIGHT: 160 LBS | BODY MASS INDEX: 22.96 KG/M2 | SYSTOLIC BLOOD PRESSURE: 139 MMHG

## 2019-11-13 DIAGNOSIS — M25.561 BILATERAL KNEE PAIN: Primary | ICD-10-CM

## 2019-11-13 DIAGNOSIS — M25.562 BILATERAL KNEE PAIN: Primary | ICD-10-CM

## 2019-11-13 PROCEDURE — 99283 EMERGENCY DEPT VISIT LOW MDM: CPT

## 2019-11-13 PROCEDURE — 96372 THER/PROPH/DIAG INJ SC/IM: CPT

## 2019-11-13 PROCEDURE — 99284 EMERGENCY DEPT VISIT MOD MDM: CPT | Performed by: PHYSICIAN ASSISTANT

## 2019-11-13 RX ORDER — KETOROLAC TROMETHAMINE 30 MG/ML
30 INJECTION, SOLUTION INTRAMUSCULAR; INTRAVENOUS ONCE
Status: COMPLETED | OUTPATIENT
Start: 2019-11-14 | End: 2019-11-13

## 2019-11-13 RX ADMIN — KETOROLAC TROMETHAMINE 30 MG: 30 INJECTION, SOLUTION INTRAMUSCULAR at 23:57

## 2019-11-14 ENCOUNTER — APPOINTMENT (EMERGENCY)
Dept: RADIOLOGY | Facility: HOSPITAL | Age: 34
End: 2019-11-14
Payer: COMMERCIAL

## 2019-11-14 PROCEDURE — 73560 X-RAY EXAM OF KNEE 1 OR 2: CPT

## 2019-11-14 RX ORDER — PREDNISONE 20 MG/1
40 TABLET ORAL DAILY
Qty: 10 TABLET | Refills: 0 | Status: SHIPPED | OUTPATIENT
Start: 2019-11-14 | End: 2019-11-19
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
## 2020-01-13 ENCOUNTER — TRANSCRIBE ORDERS (OUTPATIENT)
Dept: LAB | Facility: HOSPITAL | Age: 35
End: 2020-01-13

## 2020-01-13 ENCOUNTER — APPOINTMENT (OUTPATIENT)
Dept: LAB | Facility: HOSPITAL | Age: 35
End: 2020-01-13
Payer: COMMERCIAL

## 2020-01-13 ENCOUNTER — HOSPITAL ENCOUNTER (OUTPATIENT)
Dept: RADIOLOGY | Facility: HOSPITAL | Age: 35
Discharge: HOME | End: 2020-01-13
Payer: COMMERCIAL

## 2020-01-13 ENCOUNTER — TRANSCRIBE ORDERS (OUTPATIENT)
Dept: RADIOLOGY | Facility: HOSPITAL | Age: 35
End: 2020-01-13

## 2020-01-13 DIAGNOSIS — Z01.812 PRE-PROCEDURAL LABORATORY EXAMINATION: ICD-10-CM

## 2020-01-13 DIAGNOSIS — E66.1 DRUG-INDUCED OBESITY, UNSPECIFIED CLASSIFICATION, UNSPECIFIED WHETHER SERIOUS COMORBIDITY PRESENT: ICD-10-CM

## 2020-01-13 DIAGNOSIS — Z01.818 PREOPERATIVE EXAMINATION, UNSPECIFIED: ICD-10-CM

## 2020-01-13 DIAGNOSIS — Z01.818 PREOP EXAMINATION: Primary | ICD-10-CM

## 2020-01-13 DIAGNOSIS — E66.1 DRUG-INDUCED OBESITY, UNSPECIFIED CLASSIFICATION, UNSPECIFIED WHETHER SERIOUS COMORBIDITY PRESENT: Primary | ICD-10-CM

## 2020-01-13 DIAGNOSIS — Z01.818 PREOP EXAMINATION: ICD-10-CM

## 2020-01-13 DIAGNOSIS — R53.83 FATIGUE, UNSPECIFIED TYPE: ICD-10-CM

## 2020-01-13 DIAGNOSIS — E66.01 MORBID OBESITY (HCC): ICD-10-CM

## 2020-01-13 LAB
ALBUMIN SERPL BCP-MCNC: 3.8 G/DL (ref 3.5–5)
ALP SERPL-CCNC: 95 U/L (ref 46–116)
ALT SERPL W P-5'-P-CCNC: 39 U/L (ref 12–78)
ANION GAP SERPL CALCULATED.3IONS-SCNC: 5 MMOL/L (ref 4–13)
APTT PPP: 30 SECONDS (ref 23–37)
AST SERPL W P-5'-P-CCNC: 18 U/L (ref 5–45)
ATRIAL RATE: 66 BPM
BASOPHILS # BLD AUTO: 0.09 THOUSANDS/ΜL (ref 0–0.1)
BASOPHILS NFR BLD AUTO: 1 % (ref 0–1)
BILIRUB DIRECT SERPL-MCNC: 0.12 MG/DL (ref 0–0.2)
BILIRUB SERPL-MCNC: 0.42 MG/DL (ref 0.2–1)
BUN SERPL-MCNC: 15 MG/DL (ref 5–25)
CALCIUM SERPL-MCNC: 9.1 MG/DL (ref 8.3–10.1)
CHLORIDE SERPL-SCNC: 108 MMOL/L (ref 100–108)
CHOLEST SERPL-MCNC: 200 MG/DL (ref 50–200)
CO2 SERPL-SCNC: 27 MMOL/L (ref 21–32)
CREAT SERPL-MCNC: 0.67 MG/DL (ref 0.6–1.3)
EOSINOPHIL # BLD AUTO: 0.28 THOUSAND/ΜL (ref 0–0.61)
EOSINOPHIL NFR BLD AUTO: 2 % (ref 0–6)
ERYTHROCYTE [DISTWIDTH] IN BLOOD BY AUTOMATED COUNT: 13.2 % (ref 11.6–15.1)
GFR SERPL CREATININE-BSD FRML MDRD: 124 ML/MIN/1.73SQ M
GLUCOSE P FAST SERPL-MCNC: 79 MG/DL (ref 65–99)
HCT VFR BLD AUTO: 43.7 % (ref 36.5–49.3)
HDLC SERPL-MCNC: 48 MG/DL
HGB BLD-MCNC: 14.3 G/DL (ref 12–17)
IMM GRANULOCYTES # BLD AUTO: 0.07 THOUSAND/UL (ref 0–0.2)
IMM GRANULOCYTES NFR BLD AUTO: 1 % (ref 0–2)
INR PPP: 1.02 (ref 0.84–1.19)
LDLC SERPL CALC-MCNC: 140 MG/DL (ref 0–100)
LYMPHOCYTES # BLD AUTO: 3.02 THOUSANDS/ΜL (ref 0.6–4.47)
LYMPHOCYTES NFR BLD AUTO: 26 % (ref 14–44)
MCH RBC QN AUTO: 29.1 PG (ref 26.8–34.3)
MCHC RBC AUTO-ENTMCNC: 32.7 G/DL (ref 31.4–37.4)
MCV RBC AUTO: 89 FL (ref 82–98)
MONOCYTES # BLD AUTO: 0.78 THOUSAND/ΜL (ref 0.17–1.22)
MONOCYTES NFR BLD AUTO: 7 % (ref 4–12)
NEUTROPHILS # BLD AUTO: 7.39 THOUSANDS/ΜL (ref 1.85–7.62)
NEUTS SEG NFR BLD AUTO: 63 % (ref 43–75)
NONHDLC SERPL-MCNC: 152 MG/DL
NRBC BLD AUTO-RTO: 0 /100 WBCS
P AXIS: 36 DEGREES
PLATELET # BLD AUTO: 347 THOUSANDS/UL (ref 149–390)
PMV BLD AUTO: 11.6 FL (ref 8.9–12.7)
POTASSIUM SERPL-SCNC: 4.3 MMOL/L (ref 3.5–5.3)
PR INTERVAL: 162 MS
PROT SERPL-MCNC: 8.2 G/DL (ref 6.4–8.2)
PROTHROMBIN TIME: 13 SECONDS (ref 11.6–14.5)
QRS AXIS: 46 DEGREES
QRSD INTERVAL: 92 MS
QT INTERVAL: 388 MS
QTC INTERVAL: 406 MS
RBC # BLD AUTO: 4.91 MILLION/UL (ref 3.88–5.62)
SODIUM SERPL-SCNC: 140 MMOL/L (ref 136–145)
T WAVE AXIS: 25 DEGREES
T3 SERPL-MCNC: 1.1 NG/ML (ref 0.6–1.8)
T4 SERPL-MCNC: 9.3 UG/DL (ref 4.7–13.3)
TRIGL SERPL-MCNC: 62 MG/DL
TSH SERPL DL<=0.05 MIU/L-ACNC: 2.94 UIU/ML (ref 0.36–3.74)
VENTRICULAR RATE: 66 BPM
WBC # BLD AUTO: 11.63 THOUSAND/UL (ref 4.31–10.16)

## 2020-01-13 PROCEDURE — 36415 COLL VENOUS BLD VENIPUNCTURE: CPT

## 2020-01-13 PROCEDURE — 84436 ASSAY OF TOTAL THYROXINE: CPT

## 2020-01-13 PROCEDURE — 80061 LIPID PANEL: CPT

## 2020-01-13 PROCEDURE — 93005 ELECTROCARDIOGRAM TRACING: CPT

## 2020-01-13 PROCEDURE — 85730 THROMBOPLASTIN TIME PARTIAL: CPT

## 2020-01-13 PROCEDURE — 80048 BASIC METABOLIC PNL TOTAL CA: CPT

## 2020-01-13 PROCEDURE — 84443 ASSAY THYROID STIM HORMONE: CPT

## 2020-01-13 PROCEDURE — 85025 COMPLETE CBC W/AUTO DIFF WBC: CPT

## 2020-01-13 PROCEDURE — 80076 HEPATIC FUNCTION PANEL: CPT

## 2020-01-13 PROCEDURE — 71046 X-RAY EXAM CHEST 2 VIEWS: CPT

## 2020-01-13 PROCEDURE — 84480 ASSAY TRIIODOTHYRONINE (T3): CPT

## 2020-01-13 PROCEDURE — 85610 PROTHROMBIN TIME: CPT

## 2020-01-13 PROCEDURE — 93010 ELECTROCARDIOGRAM REPORT: CPT | Performed by: INTERNAL MEDICINE

## 2020-01-31 ENCOUNTER — HOSPITAL ENCOUNTER (EMERGENCY)
Facility: HOSPITAL | Age: 35
Discharge: HOME | End: 2020-02-01
Attending: EMERGENCY MEDICINE | Admitting: EMERGENCY MEDICINE
Payer: COMMERCIAL

## 2020-01-31 VITALS
TEMPERATURE: 98.7 F | SYSTOLIC BLOOD PRESSURE: 125 MMHG | RESPIRATION RATE: 16 BRPM | WEIGHT: 274.47 LBS | HEART RATE: 79 BPM | DIASTOLIC BLOOD PRESSURE: 76 MMHG | OXYGEN SATURATION: 97 % | BODY MASS INDEX: 39.38 KG/M2

## 2020-01-31 DIAGNOSIS — H66.91 RIGHT OTITIS MEDIA: Primary | ICD-10-CM

## 2020-01-31 PROCEDURE — 99282 EMERGENCY DEPT VISIT SF MDM: CPT

## 2020-01-31 PROCEDURE — 99283 EMERGENCY DEPT VISIT LOW MDM: CPT | Performed by: EMERGENCY MEDICINE

## 2020-02-01 RX ORDER — AMOXICILLIN 500 MG/1
1000 CAPSULE ORAL DAILY
Qty: 20 CAPSULE | Refills: 0 | Status: SHIPPED | OUTPATIENT
Start: 2020-02-01 | End: 2020-02-11

## 2020-02-01 RX ORDER — AMOXICILLIN 250 MG/1
500 CAPSULE ORAL ONCE
Status: COMPLETED | OUTPATIENT
Start: 2020-02-01 | End: 2020-02-01

## 2020-02-01 RX ADMIN — AMOXICILLIN 500 MG: 250 CAPSULE ORAL at 00:14
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
## 2020-09-08 ENCOUNTER — HOSPITAL ENCOUNTER (EMERGENCY)
Facility: HOSPITAL | Age: 35
Discharge: HOME | End: 2020-09-08
Attending: EMERGENCY MEDICINE
Payer: COMMERCIAL

## 2020-09-08 VITALS
DIASTOLIC BLOOD PRESSURE: 63 MMHG | HEART RATE: 90 BPM | BODY MASS INDEX: 30.87 KG/M2 | WEIGHT: 215.17 LBS | OXYGEN SATURATION: 99 % | TEMPERATURE: 98 F | SYSTOLIC BLOOD PRESSURE: 130 MMHG | RESPIRATION RATE: 16 BRPM

## 2020-09-08 DIAGNOSIS — B34.9 VIRAL SYNDROME: Primary | ICD-10-CM

## 2020-09-08 PROCEDURE — 99283 EMERGENCY DEPT VISIT LOW MDM: CPT

## 2020-09-08 PROCEDURE — 99284 EMERGENCY DEPT VISIT MOD MDM: CPT | Performed by: EMERGENCY MEDICINE

## 2020-09-08 RX ORDER — ONDANSETRON 4 MG/1
4 TABLET, ORALLY DISINTEGRATING ORAL EVERY 8 HOURS PRN
Qty: 10 TABLET | Refills: 0 | Status: SHIPPED | OUTPATIENT
Start: 2020-09-08 | End: 2021-04-20 | Stop reason: ALTCHOICE
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
## 2020-09-08 NOTE — Clinical Note
Jose Londono was seen and treated in our emergency department on 9/8/2020  Diagnosis:     Domenica Love  may return to work on return date  He may return on this date: 09/09/2020         If you have any questions or concerns, please don't hesitate to call        Jan    ______________________________           _______________          _______________  Hospital Representative                              Date                                Time

## 2020-12-10 ENCOUNTER — NURSE TRIAGE (OUTPATIENT)
Dept: OTHER | Facility: OTHER | Age: 35
End: 2020-12-10

## 2021-04-20 ENCOUNTER — HOSPITAL ENCOUNTER (EMERGENCY)
Facility: HOSPITAL | Age: 36
Discharge: HOME | End: 2021-04-20
Attending: EMERGENCY MEDICINE
Payer: COMMERCIAL

## 2021-04-20 VITALS
OXYGEN SATURATION: 98 % | WEIGHT: 212.74 LBS | DIASTOLIC BLOOD PRESSURE: 51 MMHG | RESPIRATION RATE: 15 BRPM | HEART RATE: 71 BPM | BODY MASS INDEX: 30.53 KG/M2 | TEMPERATURE: 98.5 F | SYSTOLIC BLOOD PRESSURE: 128 MMHG

## 2021-04-20 DIAGNOSIS — Z20.822 ENCOUNTER FOR LABORATORY TESTING FOR COVID-19 VIRUS: ICD-10-CM

## 2021-04-20 DIAGNOSIS — B34.9 VIRAL SYNDROME: Primary | ICD-10-CM

## 2021-04-20 LAB — S PYO DNA THROAT QL NAA+PROBE: NORMAL

## 2021-04-20 PROCEDURE — 87651 STREP A DNA AMP PROBE: CPT | Performed by: PHYSICIAN ASSISTANT

## 2021-04-20 PROCEDURE — U0005 INFEC AGEN DETEC AMPLI PROBE: HCPCS | Performed by: PHYSICIAN ASSISTANT

## 2021-04-20 PROCEDURE — 99282 EMERGENCY DEPT VISIT SF MDM: CPT | Performed by: PHYSICIAN ASSISTANT

## 2021-04-20 PROCEDURE — 99283 EMERGENCY DEPT VISIT LOW MDM: CPT

## 2021-04-20 PROCEDURE — U0003 INFECTIOUS AGENT DETECTION BY NUCLEIC ACID (DNA OR RNA); SEVERE ACUTE RESPIRATORY SYNDROME CORONAVIRUS 2 (SARS-COV-2) (CORONAVIRUS DISEASE [COVID-19]), AMPLIFIED PROBE TECHNIQUE, MAKING USE OF HIGH THROUGHPUT TECHNOLOGIES AS DESCRIBED BY CMS-2020-01-R: HCPCS | Performed by: PHYSICIAN ASSISTANT
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
## 2021-04-20 NOTE — Clinical Note
Alona Mchugh accompanied Satira Crimes to the emergency department on 4/20/2021  Return date if applicable: If you have any questions or concerns, please don't hesitate to call        Tere Schwab, DO

## 2021-04-20 NOTE — Clinical Note
Jayashree Zavala was seen and treated in our emergency department on 4/20/2021  Diagnosis:     Peter    He may return on this date: You were tested for COVID today  It is important that you quarantine for your results  Anyone you have been in close contact with should also quarantine  If you have any questions or concerns, please don't hesitate to call        Odalis Arevalo PA-C    ______________________________           _______________          _______________  Hospital Representative                              Date                                Time

## 2021-04-20 NOTE — Clinical Note
Luz Maria Holloway accompanied Cleveland Duran to the emergency department on 4/20/2021  Return date if applicable: If you have any questions or concerns, please don't hesitate to call        Rodriguez Givens, DO

## 2021-04-21 LAB — SARS-COV-2 RNA RESP QL NAA+PROBE: NEGATIVE

## 2021-06-19 ENCOUNTER — HOSPITAL ENCOUNTER (EMERGENCY)
Facility: HOSPITAL | Age: 36
Discharge: HOME | End: 2021-06-19
Attending: EMERGENCY MEDICINE | Admitting: EMERGENCY MEDICINE
Payer: COMMERCIAL

## 2021-06-19 VITALS
TEMPERATURE: 98.7 F | OXYGEN SATURATION: 97 % | DIASTOLIC BLOOD PRESSURE: 55 MMHG | WEIGHT: 203.71 LBS | SYSTOLIC BLOOD PRESSURE: 116 MMHG | HEART RATE: 76 BPM | RESPIRATION RATE: 16 BRPM | BODY MASS INDEX: 29.23 KG/M2

## 2021-06-19 DIAGNOSIS — M54.30 SCIATICA: ICD-10-CM

## 2021-06-19 DIAGNOSIS — G89.29 ACUTE EXACERBATION OF CHRONIC LOW BACK PAIN: Primary | ICD-10-CM

## 2021-06-19 DIAGNOSIS — M54.50 ACUTE EXACERBATION OF CHRONIC LOW BACK PAIN: Primary | ICD-10-CM

## 2021-06-19 PROCEDURE — 99283 EMERGENCY DEPT VISIT LOW MDM: CPT

## 2021-06-19 PROCEDURE — 99284 EMERGENCY DEPT VISIT MOD MDM: CPT | Performed by: PHYSICIAN ASSISTANT

## 2021-06-19 PROCEDURE — 96372 THER/PROPH/DIAG INJ SC/IM: CPT

## 2021-06-19 RX ORDER — PREDNISONE 50 MG/1
50 TABLET ORAL DAILY
Qty: 5 TABLET | Refills: 0 | Status: SHIPPED | OUTPATIENT
Start: 2021-06-19 | End: 2021-06-24

## 2021-06-19 RX ORDER — HYDROCODONE BITARTRATE AND ACETAMINOPHEN 5; 325 MG/1; MG/1
1 TABLET ORAL ONCE
Status: COMPLETED | OUTPATIENT
Start: 2021-06-19 | End: 2021-06-19

## 2021-06-19 RX ORDER — LIDOCAINE 50 MG/G
1 PATCH TOPICAL ONCE
Status: DISCONTINUED | OUTPATIENT
Start: 2021-06-19 | End: 2021-06-19 | Stop reason: HOSPADM

## 2021-06-19 RX ORDER — KETOROLAC TROMETHAMINE 30 MG/ML
15 INJECTION, SOLUTION INTRAMUSCULAR; INTRAVENOUS ONCE
Status: COMPLETED | OUTPATIENT
Start: 2021-06-19 | End: 2021-06-19

## 2021-06-19 RX ADMIN — LIDOCAINE 1 PATCH: 50 PATCH CUTANEOUS at 15:49

## 2021-06-19 RX ADMIN — KETOROLAC TROMETHAMINE 15 MG: 30 INJECTION, SOLUTION INTRAMUSCULAR; INTRAVENOUS at 15:51

## 2021-06-19 RX ADMIN — HYDROCODONE BITARTRATE AND ACETAMINOPHEN 1 TABLET: 5; 325 TABLET ORAL at 15:49
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
## 2021-06-25 ENCOUNTER — HOSPITAL ENCOUNTER (EMERGENCY)
Facility: HOSPITAL | Age: 36
Discharge: HOME | End: 2021-06-25
Attending: EMERGENCY MEDICINE
Payer: COMMERCIAL

## 2021-06-25 VITALS
BODY MASS INDEX: 28.91 KG/M2 | TEMPERATURE: 98 F | WEIGHT: 201.5 LBS | DIASTOLIC BLOOD PRESSURE: 76 MMHG | RESPIRATION RATE: 16 BRPM | OXYGEN SATURATION: 97 % | HEART RATE: 84 BPM | SYSTOLIC BLOOD PRESSURE: 117 MMHG

## 2021-06-25 DIAGNOSIS — R10.13 EPIGASTRIC PAIN: Primary | ICD-10-CM

## 2021-06-25 DIAGNOSIS — R11.0 NAUSEA: ICD-10-CM

## 2021-06-25 LAB
ALBUMIN SERPL BCP-MCNC: 3.8 G/DL (ref 3.5–5)
ALP SERPL-CCNC: 85 U/L (ref 46–116)
ALT SERPL W P-5'-P-CCNC: 23 U/L (ref 12–78)
ANION GAP SERPL CALCULATED.3IONS-SCNC: 7 MMOL/L (ref 4–13)
AST SERPL W P-5'-P-CCNC: 17 U/L (ref 5–45)
BASOPHILS # BLD AUTO: 0.04 THOUSANDS/ΜL (ref 0–0.1)
BASOPHILS NFR BLD AUTO: 0 % (ref 0–1)
BILIRUB SERPL-MCNC: 0.57 MG/DL (ref 0.2–1)
BUN SERPL-MCNC: 19 MG/DL (ref 5–25)
CALCIUM SERPL-MCNC: 8.8 MG/DL (ref 8.3–10.1)
CHLORIDE SERPL-SCNC: 108 MMOL/L (ref 100–108)
CO2 SERPL-SCNC: 26 MMOL/L (ref 21–32)
CREAT SERPL-MCNC: 0.69 MG/DL (ref 0.6–1.3)
EOSINOPHIL # BLD AUTO: 0.16 THOUSAND/ΜL (ref 0–0.61)
EOSINOPHIL NFR BLD AUTO: 2 % (ref 0–6)
ERYTHROCYTE [DISTWIDTH] IN BLOOD BY AUTOMATED COUNT: 13.2 % (ref 11.6–15.1)
GFR SERPL CREATININE-BSD FRML MDRD: 122 ML/MIN/1.73SQ M
GLUCOSE SERPL-MCNC: 80 MG/DL (ref 65–140)
HCT VFR BLD AUTO: 39.9 % (ref 36.5–49.3)
HGB BLD-MCNC: 13.3 G/DL (ref 12–17)
IMM GRANULOCYTES # BLD AUTO: 0.04 THOUSAND/UL (ref 0–0.2)
IMM GRANULOCYTES NFR BLD AUTO: 0 % (ref 0–2)
LIPASE SERPL-CCNC: 67 U/L (ref 73–393)
LYMPHOCYTES # BLD AUTO: 2.67 THOUSANDS/ΜL (ref 0.6–4.47)
LYMPHOCYTES NFR BLD AUTO: 25 % (ref 14–44)
MCH RBC QN AUTO: 29.7 PG (ref 26.8–34.3)
MCHC RBC AUTO-ENTMCNC: 33.3 G/DL (ref 31.4–37.4)
MCV RBC AUTO: 89 FL (ref 82–98)
MONOCYTES # BLD AUTO: 0.71 THOUSAND/ΜL (ref 0.17–1.22)
MONOCYTES NFR BLD AUTO: 7 % (ref 4–12)
NEUTROPHILS # BLD AUTO: 6.92 THOUSANDS/ΜL (ref 1.85–7.62)
NEUTS SEG NFR BLD AUTO: 66 % (ref 43–75)
NRBC BLD AUTO-RTO: 0 /100 WBCS
PLATELET # BLD AUTO: 287 THOUSANDS/UL (ref 149–390)
PMV BLD AUTO: 11.3 FL (ref 8.9–12.7)
POTASSIUM SERPL-SCNC: 4 MMOL/L (ref 3.5–5.3)
PROT SERPL-MCNC: 7.4 G/DL (ref 6.4–8.2)
RBC # BLD AUTO: 4.48 MILLION/UL (ref 3.88–5.62)
SODIUM SERPL-SCNC: 141 MMOL/L (ref 136–145)
WBC # BLD AUTO: 10.54 THOUSAND/UL (ref 4.31–10.16)

## 2021-06-25 PROCEDURE — 99284 EMERGENCY DEPT VISIT MOD MDM: CPT | Performed by: PHYSICIAN ASSISTANT

## 2021-06-25 PROCEDURE — 85025 COMPLETE CBC W/AUTO DIFF WBC: CPT | Performed by: PHYSICIAN ASSISTANT

## 2021-06-25 PROCEDURE — 99284 EMERGENCY DEPT VISIT MOD MDM: CPT

## 2021-06-25 PROCEDURE — 80053 COMPREHEN METABOLIC PANEL: CPT | Performed by: PHYSICIAN ASSISTANT

## 2021-06-25 PROCEDURE — 36415 COLL VENOUS BLD VENIPUNCTURE: CPT | Performed by: PHYSICIAN ASSISTANT

## 2021-06-25 PROCEDURE — 96374 THER/PROPH/DIAG INJ IV PUSH: CPT

## 2021-06-25 PROCEDURE — 83690 ASSAY OF LIPASE: CPT | Performed by: PHYSICIAN ASSISTANT

## 2021-06-25 RX ORDER — LIDOCAINE HYDROCHLORIDE 20 MG/ML
10 SOLUTION OROPHARYNGEAL ONCE
Status: DISCONTINUED | OUTPATIENT
Start: 2021-06-25 | End: 2021-06-25 | Stop reason: HOSPADM

## 2021-06-25 RX ORDER — MAGNESIUM HYDROXIDE/ALUMINUM HYDROXICE/SIMETHICONE 120; 1200; 1200 MG/30ML; MG/30ML; MG/30ML
30 SUSPENSION ORAL ONCE
Status: DISCONTINUED | OUTPATIENT
Start: 2021-06-25 | End: 2021-06-25 | Stop reason: HOSPADM

## 2021-06-25 RX ORDER — ONDANSETRON 4 MG/1
4 TABLET, ORALLY DISINTEGRATING ORAL EVERY 6 HOURS PRN
Qty: 20 TABLET | Refills: 0 | Status: SHIPPED | OUTPATIENT
Start: 2021-06-25

## 2021-06-25 RX ORDER — FAMOTIDINE 20 MG/1
20 TABLET, FILM COATED ORAL 2 TIMES DAILY PRN
Qty: 30 TABLET | Refills: 0 | Status: SHIPPED | OUTPATIENT
Start: 2021-06-25

## 2021-06-25 RX ADMIN — FAMOTIDINE 20 MG: 10 INJECTION INTRAVENOUS at 13:43

## 2021-06-25 NOTE — DISCHARGE INSTRUCTIONS
Take Zofran as prescribed as needed for nausea and vomiting  Take Pepcid as prescribed as needed for indigestion  Take Motrin or Tylenol for pain  Rest and drink plenty of fluids  Slow introduction of foods like broth, bread, rice, and other bland foods  Follow-up with PCP for monitoring of symptoms  Follow-up with GI for further evaluation of symptoms  Return to ED if symptoms worsen including increasing pain, inability to tolerate food or fluid, blood in vomit or stool, fevers

## 2021-06-25 NOTE — Clinical Note
Ashley Simmons was seen and treated in our emergency department on 6/25/2021  Diagnosis:     Loida Hansen  may return to work on return date  He may return on this date: 06/26/2021         If you have any questions or concerns, please don't hesitate to call        Barb Gonzalez PA-C    ______________________________           _______________          _______________  Hospital Representative                              Date                                Time
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
## 2021-07-30 ENCOUNTER — HOSPITAL ENCOUNTER (EMERGENCY)
Facility: HOSPITAL | Age: 36
Discharge: HOME | End: 2021-07-30
Attending: EMERGENCY MEDICINE
Payer: COMMERCIAL

## 2021-07-30 ENCOUNTER — APPOINTMENT (EMERGENCY)
Dept: RADIOLOGY | Facility: HOSPITAL | Age: 36
End: 2021-07-30
Payer: COMMERCIAL

## 2021-07-30 VITALS
HEART RATE: 77 BPM | OXYGEN SATURATION: 97 % | TEMPERATURE: 97.7 F | DIASTOLIC BLOOD PRESSURE: 73 MMHG | WEIGHT: 201.72 LBS | SYSTOLIC BLOOD PRESSURE: 109 MMHG | BODY MASS INDEX: 28.94 KG/M2 | RESPIRATION RATE: 16 BRPM

## 2021-07-30 DIAGNOSIS — G89.29 CHRONIC PAIN OF RIGHT KNEE: Primary | ICD-10-CM

## 2021-07-30 DIAGNOSIS — M25.561 CHRONIC PAIN OF RIGHT KNEE: Primary | ICD-10-CM

## 2021-07-30 PROCEDURE — 99283 EMERGENCY DEPT VISIT LOW MDM: CPT

## 2021-07-30 PROCEDURE — 73564 X-RAY EXAM KNEE 4 OR MORE: CPT

## 2021-07-30 PROCEDURE — 99284 EMERGENCY DEPT VISIT MOD MDM: CPT | Performed by: PHYSICIAN ASSISTANT

## 2021-07-30 RX ORDER — OXYCODONE HYDROCHLORIDE AND ACETAMINOPHEN 5; 325 MG/1; MG/1
1 TABLET ORAL ONCE
Status: COMPLETED | OUTPATIENT
Start: 2021-07-30 | End: 2021-07-30

## 2021-07-30 RX ADMIN — OXYCODONE HYDROCHLORIDE AND ACETAMINOPHEN 1 TABLET: 5; 325 TABLET ORAL at 10:51

## 2021-07-30 NOTE — DISCHARGE INSTRUCTIONS
Please refer to the attached information for strict return instructions  If symptoms worsen or new symptoms develop please return to the ER  Please follow  up with orthopedic surgery for re-evaluation of symptoms  [place] to [place].  C/o flu-like symptoms

## 2021-07-30 NOTE — Clinical Note
Beto Barney was seen and treated in our emergency department on 7/30/2021  Diagnosis:     Peter    He may return on this date: 08/01/2021         If you have any questions or concerns, please don't hesitate to call        Dandy Feng PA-C    ______________________________           _______________          _______________  Hospital Representative                              Date                                Time
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
## 2021-08-16 ENCOUNTER — HOSPITAL ENCOUNTER (EMERGENCY)
Facility: HOSPITAL | Age: 36
Discharge: HOME | End: 2021-08-16
Attending: EMERGENCY MEDICINE | Admitting: EMERGENCY MEDICINE
Payer: COMMERCIAL

## 2021-08-16 VITALS
SYSTOLIC BLOOD PRESSURE: 118 MMHG | BODY MASS INDEX: 28.66 KG/M2 | OXYGEN SATURATION: 100 % | DIASTOLIC BLOOD PRESSURE: 80 MMHG | HEART RATE: 70 BPM | RESPIRATION RATE: 16 BRPM | WEIGHT: 199.74 LBS | TEMPERATURE: 98 F

## 2021-08-16 DIAGNOSIS — L03.90 CELLULITIS: ICD-10-CM

## 2021-08-16 DIAGNOSIS — R21 RASH AND NONSPECIFIC SKIN ERUPTION: Primary | ICD-10-CM

## 2021-08-16 PROCEDURE — 99282 EMERGENCY DEPT VISIT SF MDM: CPT

## 2021-08-16 PROCEDURE — 99284 EMERGENCY DEPT VISIT MOD MDM: CPT | Performed by: EMERGENCY MEDICINE

## 2021-08-16 RX ORDER — PREDNISONE 20 MG/1
TABLET ORAL
Qty: 10 TABLET | Refills: 0 | Status: SHIPPED | OUTPATIENT
Start: 2021-08-16 | End: 2021-08-23

## 2021-08-16 RX ORDER — PREDNISONE 20 MG/1
60 TABLET ORAL ONCE
Status: COMPLETED | OUTPATIENT
Start: 2021-08-16 | End: 2021-08-16

## 2021-08-16 RX ORDER — CEPHALEXIN 500 MG/1
500 CAPSULE ORAL EVERY 6 HOURS SCHEDULED
Qty: 28 CAPSULE | Refills: 0 | Status: SHIPPED | OUTPATIENT
Start: 2021-08-16 | End: 2021-08-23

## 2021-08-16 RX ORDER — CEPHALEXIN 250 MG/1
500 CAPSULE ORAL ONCE
Status: COMPLETED | OUTPATIENT
Start: 2021-08-16 | End: 2021-08-16

## 2021-08-16 RX ADMIN — PREDNISONE 60 MG: 20 TABLET ORAL at 21:36

## 2021-08-16 RX ADMIN — CEPHALEXIN 500 MG: 250 CAPSULE ORAL at 21:36
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
## 2021-11-18 ENCOUNTER — HOSPITAL ENCOUNTER (EMERGENCY)
Facility: HOSPITAL | Age: 36
Discharge: HOME | End: 2021-11-18
Attending: EMERGENCY MEDICINE | Admitting: EMERGENCY MEDICINE
Payer: COMMERCIAL

## 2021-11-18 VITALS
BODY MASS INDEX: 29.51 KG/M2 | WEIGHT: 206.13 LBS | DIASTOLIC BLOOD PRESSURE: 56 MMHG | OXYGEN SATURATION: 97 % | HEIGHT: 70 IN | HEART RATE: 83 BPM | RESPIRATION RATE: 16 BRPM | SYSTOLIC BLOOD PRESSURE: 124 MMHG | TEMPERATURE: 97.9 F

## 2021-11-18 DIAGNOSIS — M79.651 MUSCULOSKELETAL PAIN OF RIGHT THIGH: ICD-10-CM

## 2021-11-18 DIAGNOSIS — M25.561 CHRONIC PAIN OF RIGHT KNEE: ICD-10-CM

## 2021-11-18 DIAGNOSIS — M79.652 MUSCULOSKELETAL PAIN OF LEFT THIGH: Primary | ICD-10-CM

## 2021-11-18 DIAGNOSIS — G89.29 CHRONIC PAIN OF RIGHT KNEE: ICD-10-CM

## 2021-11-18 PROCEDURE — 99283 EMERGENCY DEPT VISIT LOW MDM: CPT

## 2021-11-18 PROCEDURE — 99284 EMERGENCY DEPT VISIT MOD MDM: CPT | Performed by: EMERGENCY MEDICINE

## 2021-11-18 RX ORDER — LIDOCAINE 50 MG/G
1 PATCH TOPICAL DAILY
Qty: 15 PATCH | Refills: 0 | Status: SHIPPED | OUTPATIENT
Start: 2021-11-18

## 2021-11-18 RX ORDER — IBUPROFEN 600 MG/1
600 TABLET ORAL EVERY 6 HOURS PRN
Qty: 30 TABLET | Refills: 0 | Status: SHIPPED | OUTPATIENT
Start: 2021-11-18

## 2022-01-05 DIAGNOSIS — Z11.59 ENCOUNTER FOR SCREENING FOR VIRAL DISEASE: Primary | ICD-10-CM

## 2022-01-05 PROCEDURE — U0005 INFEC AGEN DETEC AMPLI PROBE: HCPCS | Performed by: ANESTHESIOLOGY

## 2022-01-05 PROCEDURE — U0003 INFECTIOUS AGENT DETECTION BY NUCLEIC ACID (DNA OR RNA); SEVERE ACUTE RESPIRATORY SYNDROME CORONAVIRUS 2 (SARS-COV-2) (CORONAVIRUS DISEASE [COVID-19]), AMPLIFIED PROBE TECHNIQUE, MAKING USE OF HIGH THROUGHPUT TECHNOLOGIES AS DESCRIBED BY CMS-2020-01-R: HCPCS | Performed by: ANESTHESIOLOGY

## 2022-01-08 ENCOUNTER — TELEPHONE (OUTPATIENT)
Dept: OTHER | Facility: OTHER | Age: 37
End: 2022-01-08

## 2022-01-08 NOTE — TELEPHONE ENCOUNTER
Your test for the novel coronavirus, also known as COVID-19, was positive  The sample showed that the virus was present  Positive COVID-19 test results are reportable to the [place] Department of Health  You may receive a call from trained public health staff to conduct an interview  It is important to answer their call  They will ask you to verify who you are  During the call they will ask you about what symptoms you have, what you did before you got sick, and who you were close to while sick  The health department does this to make sure everyone stays healthy and to reduce the spread of the virus  If you would like to verify if the caller does in fact work in contact tracing, call the [address] [place] [address] at [email or web address] ([phone])  For additional information, please visit the [facility]  website: www health [place] gov     If you have any additional questions, we can schedule a virtual visit for you with a provider or call the [facility] [phone], option 7, for care advice    For additional information, please visit the Coronavirus FAQ on the [facility] home page ([email or web address]  org)

## 2022-06-12 ENCOUNTER — HOSPITAL ENCOUNTER (EMERGENCY)
Facility: HOSPITAL | Age: 37
Discharge: HOME | End: 2022-06-12
Attending: EMERGENCY MEDICINE | Admitting: EMERGENCY MEDICINE
Payer: COMMERCIAL

## 2022-06-12 VITALS
WEIGHT: 199.74 LBS | DIASTOLIC BLOOD PRESSURE: 76 MMHG | OXYGEN SATURATION: 99 % | RESPIRATION RATE: 18 BRPM | BODY MASS INDEX: 28.66 KG/M2 | HEART RATE: 90 BPM | TEMPERATURE: 98.3 F | SYSTOLIC BLOOD PRESSURE: 124 MMHG

## 2022-06-12 DIAGNOSIS — R52 BODY ACHES: ICD-10-CM

## 2022-06-12 DIAGNOSIS — R51.9 HEADACHE: ICD-10-CM

## 2022-06-12 DIAGNOSIS — R11.2 NAUSEA AND VOMITING: ICD-10-CM

## 2022-06-12 DIAGNOSIS — R19.7 DIARRHEA: ICD-10-CM

## 2022-06-12 DIAGNOSIS — Z20.822 PERSON UNDER INVESTIGATION FOR COVID-19: Primary | ICD-10-CM

## 2022-06-12 PROCEDURE — 99284 EMERGENCY DEPT VISIT MOD MDM: CPT | Performed by: EMERGENCY MEDICINE

## 2022-06-12 PROCEDURE — 87636 SARSCOV2 & INF A&B AMP PRB: CPT | Performed by: EMERGENCY MEDICINE

## 2022-06-12 PROCEDURE — 99284 EMERGENCY DEPT VISIT MOD MDM: CPT

## 2022-06-12 PROCEDURE — 96372 THER/PROPH/DIAG INJ SC/IM: CPT

## 2022-06-12 RX ORDER — MAGNESIUM HYDROXIDE/ALUMINUM HYDROXICE/SIMETHICONE 120; 1200; 1200 MG/30ML; MG/30ML; MG/30ML
30 SUSPENSION ORAL ONCE
Status: COMPLETED | OUTPATIENT
Start: 2022-06-12 | End: 2022-06-12

## 2022-06-12 RX ORDER — KETOROLAC TROMETHAMINE 30 MG/ML
15 INJECTION, SOLUTION INTRAMUSCULAR; INTRAVENOUS ONCE
Status: COMPLETED | OUTPATIENT
Start: 2022-06-12 | End: 2022-06-12

## 2022-06-12 RX ORDER — ONDANSETRON 4 MG/1
4 TABLET, ORALLY DISINTEGRATING ORAL ONCE
Status: COMPLETED | OUTPATIENT
Start: 2022-06-12 | End: 2022-06-12

## 2022-06-12 RX ORDER — NAPROXEN 500 MG/1
500 TABLET ORAL 2 TIMES DAILY WITH MEALS
Qty: 10 TABLET | Refills: 0 | Status: SHIPPED | OUTPATIENT
Start: 2022-06-12

## 2022-06-12 RX ORDER — ONDANSETRON 4 MG/1
4 TABLET, ORALLY DISINTEGRATING ORAL EVERY 6 HOURS PRN
Qty: 10 TABLET | Refills: 0 | Status: SHIPPED | OUTPATIENT
Start: 2022-06-12

## 2022-06-12 RX ADMIN — ALUMINUM HYDROXIDE, MAGNESIUM HYDROXIDE, AND SIMETHICONE 30 ML: 200; 200; 20 SUSPENSION ORAL at 13:49

## 2022-06-12 RX ADMIN — KETOROLAC TROMETHAMINE 15 MG: 30 INJECTION, SOLUTION INTRAMUSCULAR; INTRAVENOUS at 13:50

## 2022-06-12 RX ADMIN — ONDANSETRON 4 MG: 4 TABLET, ORALLY DISINTEGRATING ORAL at 13:50

## 2022-06-12 NOTE — Clinical Note
Brandi Worley was seen and treated in our emergency department on 6/12/2022  Diagnosis:     Peter    He may return on this date: 06/16/2022    If COVID is positive, you must isolate for 5 days and be fever free for 24 hours  If you have any questions or concerns, please don't hesitate to call        Jimena Blake 24, DO    ______________________________           _______________          _______________  Hospital Representative                              Date                                Time

## 2022-06-12 NOTE — DISCHARGE INSTRUCTIONS
CoVID-19 Adalberto de cuidado domiciliario  Gonzalez proveedor de [employer] y/o personal de man pública lo/la ha evaluado y ha determinado que no necesita ser hospitalizado/a en anton momento  ? En anton momento usted puede ser aislado/a en casa donde será monitoreado/a por el personal de gonzalez departamento de man local o estatal  Debe seguir cuidadosamente los pasos de prevención y aislamiento que se indican a continuación, hasta que un proveedor de atención médica o el departamento de man local o estatal indique que puede volver a kaela actividades normales  Quédate en casa excepto si necesita recibir [employer] que están levemente enfermas con COVID-19 pueden aislarse en casa weston gonzalez recuperacion  Usted debe restringir las actividades fuera de gonzalez hogar, excepto para recibir [employer]  No se presente al Yaolan.cometta Red, a la escuela o en áreas públicas  Evite el uso del transporte público, el uso compartido de transporte o los taxis  Aislese de [employer] y [place] en gonzalez domicilio  Personas: En la medida de lo posible, debe quedarte en ric habitación específica y lejos de otras personas en gonzalez hogar  Además, debe usar un baño separado, si está disponible  Animales: Debe restringir el contacto con mascotas y otros animales [address] enfermo/a con COVID-19, al igual que lo haría con [employer]  Aunque no goodwin habido informes de mascotas u otros animales que se enferman con COVID-19, todavía se recomienda que las personas enfermas con COVID-19 limiten el contacto con los animales hasta que se sepa más información sobre el virus  En lo posible, pida a otro miembro de gonzalez hogar que cuide de kaela animales mientras esté enfermo/a  Si está enfermo/a con COVID-19, evite el contacto con gonzalez mascota, incluyendo acariciar, abrazar, besar o ser lamido/a, al igual que compartir alimentos   Si debe cuidar de gonzalez mascota o estar cerca de [employer] está enfermo/a, lávese las leonor antes y KOFARZANEH de interactuar con las mascotas y use ric máscara facial  Consulte COVID-19 y Animales para obtener más información  Llame antes de visitar a gonzalez médico  Si tiene [employer], llame al proveedor de [address] y dígale que tiene o puede tener COVID-19  [place] ayudará al [employer] del proveedor de atención médica a senait medidas para evitar que otras personas se infecten o expongan  Utilize máscara facial  Debe usar mascara facial cuando esté cerca de otras personas (por ejemplo, compartir ric habitación o vehículo) o mascotas y antes de entrar en la oficina de un proveedor de [address]  Si usted no puede usar máscara facial (por ejemplo, porque causa problemas para respirar), entonces las personas que viven con usted no deben permanecer en la misma habitación con usted, o deben usar máscara facial si entran a gonzalez habitación  Patricia Daly gonzalez tos y/o estornudos   Cúbrase la boca y la Georgianne Jetty con un pañuelo desechable cuando tosa o estornude  Tire los pañuelos usados en un contenedor de basura que tenga cobertura  Lávese las leonor inmediatamente con agua y jabón weston al menos 21 segundos o, si no hay agua y jabón disponibles, límpiese las leonor con un desinfectante de leonor a base de alcohol que contenga al menos un 60% de alcohol  Límpiese las leonor con frecuencia  [employer] las leonor a menudo con agua y jabón weston al menos 20 segundos, especialmente después de sonarse la nariz, toser o estornudar, ir al baño, y antes de comer o preparar alimentos  Si agua y jabón no están disponibles fácilmente, utilize un desinfectante de leonor a base de alcohol con al menos un 60% de alcohol, cubriendo todas las superficies de kaela leonor y frotándolas hasta que se sientan secas  Shaina Em y agua son la mejor opción si las leonor están visiblemente sucias  Evite tocarse los ojos, la nariz y la boca con las leonor sin Nevelyn Kermit    Evite compartir artículos personales en el hogar  No debe compartir platos, vasos para beber, tazas, utensilios para comer, toallas o ropa de cama con otras personas o mascotas en gonzalez hogar  Después de [employer], deben lavarse muy jerry con agua y [place]  Limpie todas las superficies "de toque frequente" todos los [address] superficies de toque fecuente incluyen encimeras o [place], mesas, escritorios, pomos o perillas de marilyn, accesorios de baño, inodoros, teléfonos, teclados, tabletas y [address] de noche  Además, limpie las superficies que puedan contener shon, heces fecales o líquidos corporales  Utilice un spray de limpieza para el hogar o ric toallita desechable, de acuerdo con las instrucciones de la etiqueta del product utilizado para limpiar  Las [employer] contienen instrucciones para un uso seguro y eficaz del producto de limpieza, incluidas las precauciones que deben tomarse al aplicar el producto, incluyendo el uso de guantes y asegurarse de que haya ric buena ventilación weston el uso del producto  Monitorea kaela síntomas  Busca atención médica inmediata si tu enfermedad está empeorando (por ejemplo, dificultad para respirar)  Antes de buscar [employer], llame a gonzalez proveedor de [employer] y elsi que tiene, o está siendo evaluado para, COVID-19  Robert ric mascara facial antes de entrar en las instalaciones  Estos pasos ayudarán al [employer] del proveedor de atención médica a evitar que otras personas en la oficina o la christina de espera se infecten o expongan  [place] a gonzalez proveedor de [employer] llame al departamento de man local o estatal  Las personas que se sometan a un seguimiento activo o que se les facilite la autosupervisión deben seguir las instrucciones proporcionadas por gonzalez departamento de man local o profesionales de man ocupacional, según sea apropiado  Si tiene ric emergencia médica y necesita llamar al 911, notifique al personal de despacho que tiene o está siendo evaluado para COVID-19   Si es posible, pongase ric mascara facial antes de que lleguen los servicios médicos de emergencia  Discontinuar el aislamiento del hogar  Los pacientes con COVID-19 confirmado deben permanecer bajo precauciones de aislamiento en el hogar hasta que se cumplan las siguientes condiciones:  No arreguin tenido fiebre weston al menos 24 horas (es decir, un día completos sin fiebre sin el medicamento que reduce la fiebre)  Y  otros síntomas arreguin elver (por ejemplo, cuando gonzalez tos o falta de aire arreguin elver)  Y  Si tuvo ric enfermedad leve o moderada, arreguin pasado al menos 10 ra desde que aparecieron los síntomas por primera vez o si la enfermedad es grave (necesita oxígeno) o está inmunosuprimido, arreguin pasado al menos 21 ra desde que aparecieron los primeros síntomas  Los pacientes con COVID-19 confirmado también deben notificar a los contactos cercanos (incluido gonzalez lugar de trabajo) y pedirles que se pongan en cuarentena  Actualmente, el contacto cercano se define john estar dentro de los 6 pies weston 15 minutos o más desde el período 24 horas comenzando 48 horas antes del inicio de los síntomas hasta el momento en que el paciente se aisló  El paciente debe indicar a los contactos cercanos de pacientes diagnosticados con COVID-19 que se pongan en cuarentena weston 14 días desde la última vez que tuvieron gonzalez último contacto con el [place]      Source: [employer] fi

## 2022-06-12 NOTE — Clinical Note
Elyse Patel was seen and treated in our emergency department on 6/12/2022  No restrictions            Diagnosis:     Samara  may return to work on return date  He may return on this date: 06/14/2022    COVID negative  If you have any questions or concerns, please don't hesitate to call        Aris Mares RN    ______________________________           _______________          _______________  Hospital Representative                              Date                                Time
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
## 2022-06-13 LAB
FLUAV RNA RESP QL NAA+PROBE: NEGATIVE
FLUBV RNA RESP QL NAA+PROBE: NEGATIVE
SARS-COV-2 RNA RESP QL NAA+PROBE: NEGATIVE

## 2022-07-05 ENCOUNTER — APPOINTMENT (EMERGENCY)
Dept: CT IMAGING | Facility: HOSPITAL | Age: 37
End: 2022-07-05

## 2022-07-05 ENCOUNTER — APPOINTMENT (EMERGENCY)
Dept: RADIOLOGY | Facility: HOSPITAL | Age: 37
End: 2022-07-05

## 2022-07-05 ENCOUNTER — HOSPITAL ENCOUNTER (EMERGENCY)
Facility: HOSPITAL | Age: 37
Discharge: HOME | End: 2022-07-05
Attending: EMERGENCY MEDICINE
Payer: COMMERCIAL

## 2022-07-05 VITALS
DIASTOLIC BLOOD PRESSURE: 64 MMHG | BODY MASS INDEX: 28.79 KG/M2 | OXYGEN SATURATION: 98 % | RESPIRATION RATE: 17 BRPM | TEMPERATURE: 98.1 F | SYSTOLIC BLOOD PRESSURE: 111 MMHG | HEART RATE: 86 BPM | WEIGHT: 200.62 LBS
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
## 2022-07-05 DIAGNOSIS — S06.0XAA CONCUSSION: ICD-10-CM

## 2022-07-05 PROCEDURE — 99284 EMERGENCY DEPT VISIT MOD MDM: CPT

## 2022-07-05 PROCEDURE — G1004 CDSM NDSC: HCPCS

## 2022-07-05 PROCEDURE — 96372 THER/PROPH/DIAG INJ SC/IM: CPT

## 2022-07-05 PROCEDURE — 70450 CT HEAD/BRAIN W/O DYE: CPT

## 2022-07-05 PROCEDURE — 73564 X-RAY EXAM KNEE 4 OR MORE: CPT

## 2022-07-05 PROCEDURE — 99284 EMERGENCY DEPT VISIT MOD MDM: CPT | Performed by: EMERGENCY MEDICINE

## 2022-07-05 RX ORDER — IBUPROFEN 600 MG/1
600 TABLET ORAL EVERY 6 HOURS PRN
Qty: 30 TABLET | Refills: 0 | Status: SHIPPED | OUTPATIENT
Start: 2022-07-05

## 2022-07-05 RX ORDER — KETOROLAC TROMETHAMINE 30 MG/ML
15 INJECTION, SOLUTION INTRAMUSCULAR; INTRAVENOUS ONCE
Status: COMPLETED | OUTPATIENT
Start: 2022-07-05 | End: 2022-07-05

## 2022-07-05 RX ORDER — KETOROLAC TROMETHAMINE 30 MG/ML
15 INJECTION, SOLUTION INTRAMUSCULAR; INTRAVENOUS ONCE
Status: DISCONTINUED | OUTPATIENT
Start: 2022-07-05 | End: 2022-07-05

## 2022-07-05 RX ADMIN — KETOROLAC TROMETHAMINE 15 MG: 30 INJECTION, SOLUTION INTRAMUSCULAR; INTRAVENOUS at 09:11

## 2022-07-05 NOTE — DISCHARGE INSTRUCTIONS
Your symptoms are suggestive of mild concussion, please read attached information on post-concussion care  If your symptoms persist you can reach out to the physical therapy group attached as they have a concussion clinic and may be able to help  Follow up with your PCP  If you develop new or worsening symptoms, please return to the Emergency Department for further evaluation
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
## 2022-07-05 NOTE — Clinical Note
Indu Mallory was seen and treated in our emergency department on 7/5/2022  Diagnosis: Concussion    Jerald Johnson  may return to work on return date  He may return on this date: 07/07/2022         If you have any questions or concerns, please don't hesitate to call        Peggy Hendrickson MD    ______________________________           _______________          _______________  Hospital Representative                              Date                                Time

## 2022-07-05 NOTE — ED NOTES
Patient transported to [address] [place] [address] Dear Marvel [facility]  07/05/22 4133
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
## 2022-12-06 ENCOUNTER — HOSPITAL ENCOUNTER (EMERGENCY)
Facility: HOSPITAL | Age: 37
Discharge: HOME | End: 2022-12-06
Attending: EMERGENCY MEDICINE

## 2022-12-06 VITALS
DIASTOLIC BLOOD PRESSURE: 74 MMHG | SYSTOLIC BLOOD PRESSURE: 139 MMHG | RESPIRATION RATE: 15 BRPM | TEMPERATURE: 98.5 F | OXYGEN SATURATION: 99 % | HEART RATE: 89 BPM

## 2022-12-06 DIAGNOSIS — J11.1 INFLUENZA-LIKE ILLNESS: Primary | ICD-10-CM

## 2022-12-06 RX ORDER — IBUPROFEN 600 MG/1
600 TABLET ORAL EVERY 6 HOURS PRN
Qty: 30 TABLET | Refills: 0 | Status: SHIPPED | OUTPATIENT
Start: 2022-12-06

## 2022-12-06 RX ORDER — BENZONATATE 100 MG/1
100 CAPSULE ORAL EVERY 8 HOURS
Qty: 21 CAPSULE | Refills: 0 | Status: SHIPPED | OUTPATIENT
Start: 2022-12-06
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
## 2022-12-06 NOTE — Clinical Note
Mar Mauricio was seen and treated in our emergency department on 12/6/2022  Diagnosis:     Peter    He may return on this date: 12/07/2022         If you have any questions or concerns, please don't hesitate to call        Krupa Desai PA-C    ______________________________           _______________          _______________  Hospital Representative                              Date                                Time

## 2023-03-09 ENCOUNTER — HOSPITAL ENCOUNTER (EMERGENCY)
Facility: HOSPITAL | Age: 38
Discharge: HOME | End: 2023-03-09

## 2023-03-09 VITALS
TEMPERATURE: 97.9 F | WEIGHT: 196.21 LBS | OXYGEN SATURATION: 100 % | HEIGHT: 70 IN | RESPIRATION RATE: 15 BRPM | BODY MASS INDEX: 28.09 KG/M2 | DIASTOLIC BLOOD PRESSURE: 61 MMHG | SYSTOLIC BLOOD PRESSURE: 116 MMHG | HEART RATE: 80 BPM

## 2023-03-09 DIAGNOSIS — M54.50 LOW BACK PAIN: Primary | ICD-10-CM

## 2023-03-09 DIAGNOSIS — M79.606 LEG PAIN: ICD-10-CM

## 2023-03-09 RX ORDER — ACETAMINOPHEN 325 MG/1
975 TABLET ORAL ONCE
Status: COMPLETED | OUTPATIENT
Start: 2023-03-09 | End: 2023-03-09

## 2023-03-09 RX ORDER — LIDOCAINE 50 MG/G
1 PATCH TOPICAL ONCE
Status: DISCONTINUED | OUTPATIENT
Start: 2023-03-09 | End: 2023-03-09 | Stop reason: HOSPADM

## 2023-03-09 RX ORDER — KETOROLAC TROMETHAMINE 30 MG/ML
15 INJECTION, SOLUTION INTRAMUSCULAR; INTRAVENOUS ONCE
Status: COMPLETED | OUTPATIENT
Start: 2023-03-09 | End: 2023-03-09

## 2023-03-09 RX ORDER — METHOCARBAMOL 750 MG/1
750 TABLET, FILM COATED ORAL 2 TIMES DAILY PRN
Qty: 20 TABLET | Refills: 0 | Status: SHIPPED | OUTPATIENT
Start: 2023-03-09

## 2023-03-09 RX ORDER — TRAMADOL HYDROCHLORIDE 50 MG/1
50 TABLET ORAL EVERY 8 HOURS PRN
Qty: 10 TABLET | Refills: 0 | Status: SHIPPED | OUTPATIENT
Start: 2023-03-09

## 2023-03-09 RX ORDER — ACETAMINOPHEN 500 MG
500 TABLET ORAL EVERY 6 HOURS PRN
Qty: 30 TABLET | Refills: 0 | Status: SHIPPED | OUTPATIENT
Start: 2023-03-09

## 2023-03-09 RX ADMIN — KETOROLAC TROMETHAMINE 15 MG: 30 INJECTION, SOLUTION INTRAMUSCULAR at 08:28

## 2023-03-09 RX ADMIN — LIDOCAINE 1 PATCH: 50 PATCH TOPICAL at 08:29

## 2023-03-09 RX ADMIN — ACETAMINOPHEN 325MG 975 MG: 325 TABLET ORAL at 08:29

## 2023-03-09 NOTE — DISCHARGE INSTRUCTIONS
Please refer to the attached information for strict return instructions  If symptoms worsen or new symptoms develop please return to the ER  Please follow up with orthopedics for re-evaluation

## 2023-03-09 NOTE — Clinical Note
Courtney Fontana was seen and treated in our emergency department on 3/9/2023  Diagnosis:     Peter    He may return on this date: 03/13/2023         If you have any questions or concerns, please don't hesitate to call        Lula Aguirre PA-C    ______________________________           _______________          _______________  Hospital Representative                              Date                                Time
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
## 2023-03-16 ENCOUNTER — HOSPITAL ENCOUNTER (EMERGENCY)
Facility: HOSPITAL | Age: 38
Discharge: HOME | End: 2023-03-16
Attending: EMERGENCY MEDICINE

## 2023-03-16 VITALS
WEIGHT: 192.24 LBS | DIASTOLIC BLOOD PRESSURE: 68 MMHG | HEART RATE: 86 BPM | RESPIRATION RATE: 18 BRPM | TEMPERATURE: 97.4 F | BODY MASS INDEX: 27.58 KG/M2 | OXYGEN SATURATION: 99 % | SYSTOLIC BLOOD PRESSURE: 104 MMHG

## 2023-03-16 DIAGNOSIS — M25.561 BILATERAL KNEE PAIN: ICD-10-CM

## 2023-03-16 DIAGNOSIS — G89.29 ACUTE EXACERBATION OF CHRONIC LOW BACK PAIN: Primary | ICD-10-CM

## 2023-03-16 DIAGNOSIS — R10.9 RECURRENT ABDOMINAL PAIN: ICD-10-CM

## 2023-03-16 DIAGNOSIS — M54.50 ACUTE EXACERBATION OF CHRONIC LOW BACK PAIN: Primary | ICD-10-CM

## 2023-03-16 DIAGNOSIS — M25.562 BILATERAL KNEE PAIN: ICD-10-CM

## 2023-03-16 RX ORDER — SUCRALFATE 1 G/1
1 TABLET ORAL 4 TIMES DAILY
Qty: 56 TABLET | Refills: 0 | Status: SHIPPED | OUTPATIENT
Start: 2023-03-16 | End: 2023-03-30

## 2023-03-16 RX ORDER — TRAMADOL HYDROCHLORIDE 50 MG/1
50 TABLET ORAL EVERY 6 HOURS PRN
Qty: 20 TABLET | Refills: 0 | Status: SHIPPED | OUTPATIENT
Start: 2023-03-16

## 2023-03-16 RX ORDER — OMEPRAZOLE 40 MG/1
40 CAPSULE, DELAYED RELEASE ORAL DAILY
Qty: 30 CAPSULE | Refills: 0 | Status: SHIPPED | OUTPATIENT
Start: 2023-03-16

## 2023-03-16 RX ORDER — KETOROLAC TROMETHAMINE 30 MG/ML
30 INJECTION, SOLUTION INTRAMUSCULAR; INTRAVENOUS ONCE
Status: COMPLETED | OUTPATIENT
Start: 2023-03-16 | End: 2023-03-16

## 2023-03-16 RX ADMIN — KETOROLAC TROMETHAMINE 30 MG: 30 INJECTION, SOLUTION INTRAMUSCULAR; INTRAVENOUS at 09:39

## 2023-03-16 NOTE — Clinical Note
Miya Duongflorinda was seen and treated in our emergency department on 3/16/2023  Diagnosis:     Kim Sánchez  may return to work on return date  He may return on this date: 03/20/2023         If you have any questions or concerns, please don't hesitate to call        Wan Bellamy DO    ______________________________           _______________          _______________  Hospital Representative                              Date                                Time
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
## 2023-03-27 ENCOUNTER — OFFICE VISIT (OUTPATIENT)
Dept: OBGYN CLINIC | Facility: CLINIC | Age: 38
End: 2023-03-27

## 2023-03-27 VITALS
WEIGHT: 192 LBS | DIASTOLIC BLOOD PRESSURE: 72 MMHG | SYSTOLIC BLOOD PRESSURE: 110 MMHG | HEART RATE: 70 BPM | BODY MASS INDEX: 27.49 KG/M2 | HEIGHT: 70 IN

## 2023-03-27 DIAGNOSIS — M25.461 EFFUSION OF RIGHT KNEE: ICD-10-CM

## 2023-03-27 DIAGNOSIS — G89.29 CHRONIC PAIN OF RIGHT KNEE: ICD-10-CM

## 2023-03-27 DIAGNOSIS — M54.42 CHRONIC MIDLINE LOW BACK PAIN WITH BILATERAL SCIATICA: Primary | ICD-10-CM

## 2023-03-27 DIAGNOSIS — G89.29 CHRONIC MIDLINE LOW BACK PAIN WITH BILATERAL SCIATICA: Primary | ICD-10-CM

## 2023-03-27 DIAGNOSIS — M25.561 CHRONIC PAIN OF RIGHT KNEE: ICD-10-CM

## 2023-03-27 DIAGNOSIS — Z98.890 HISTORY OF RIGHT KNEE SURGERY: ICD-10-CM

## 2023-03-27 DIAGNOSIS — M54.41 CHRONIC MIDLINE LOW BACK PAIN WITH BILATERAL SCIATICA: Primary | ICD-10-CM

## 2023-03-27 DIAGNOSIS — M51.36 LUMBAR DEGENERATIVE DISC DISEASE: ICD-10-CM
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
## 2023-03-27 NOTE — LETTER
March 27, 2023     Patient: Lesleigh Goldmann  YOB: 1985  Date of Visit: 3/27/2023      To Whom it May Concern:    Lesleigh Goldmann is under my professional care  Mahendra Cook was seen in my office on 3/27/2023  Work excuse today and tomorrow  If you have any questions or concerns, please don't hesitate to call           Sincerely,          Monse Rodas MD        CC: No Recipients

## 2023-04-04 ENCOUNTER — HOSPITAL ENCOUNTER (OUTPATIENT)
Dept: MRI IMAGING | Facility: HOSPITAL | Age: 38
Discharge: HOME | End: 2023-04-04

## 2023-04-04 DIAGNOSIS — M54.41 CHRONIC MIDLINE LOW BACK PAIN WITH BILATERAL SCIATICA: ICD-10-CM

## 2023-04-04 DIAGNOSIS — M25.561 CHRONIC PAIN OF RIGHT KNEE: ICD-10-CM

## 2023-04-04 DIAGNOSIS — G89.29 CHRONIC PAIN OF RIGHT KNEE: ICD-10-CM

## 2023-04-04 DIAGNOSIS — M54.42 CHRONIC MIDLINE LOW BACK PAIN WITH BILATERAL SCIATICA: ICD-10-CM

## 2023-04-04 DIAGNOSIS — G89.29 CHRONIC MIDLINE LOW BACK PAIN WITH BILATERAL SCIATICA: ICD-10-CM

## 2023-04-04 DIAGNOSIS — M25.461 EFFUSION OF RIGHT KNEE: ICD-10-CM

## 2023-04-04 DIAGNOSIS — Z98.890 HISTORY OF RIGHT KNEE SURGERY: ICD-10-CM

## 2023-04-04 DIAGNOSIS — M51.36 LUMBAR DEGENERATIVE DISC DISEASE: ICD-10-CM

## 2023-04-07 ENCOUNTER — TELEPHONE (OUTPATIENT)
Dept: PAIN MEDICINE | Facility: CLINIC | Age: 38
End: 2023-04-07

## 2023-04-07 NOTE — TELEPHONE ENCOUNTER
Informed patient Dr Se Bridges feels he cannot help him with pain management any further than [facility]  Offered other locations for pain management but patient declined the offer      4/7 LB

## 2023-04-29 ENCOUNTER — HOSPITAL ENCOUNTER (EMERGENCY)
Facility: HOSPITAL | Age: 38
Discharge: HOME | End: 2023-04-29
Attending: EMERGENCY MEDICINE

## 2023-04-29 VITALS
BODY MASS INDEX: 26.14 KG/M2 | HEART RATE: 71 BPM | WEIGHT: 187.39 LBS | RESPIRATION RATE: 18 BRPM | OXYGEN SATURATION: 99 % | TEMPERATURE: 97.8 F | SYSTOLIC BLOOD PRESSURE: 122 MMHG | DIASTOLIC BLOOD PRESSURE: 80 MMHG

## 2023-04-29 DIAGNOSIS — F41.9 ANXIETY: Primary | ICD-10-CM

## 2023-04-29 DIAGNOSIS — F32.A DEPRESSION: ICD-10-CM

## 2023-04-29 RX ORDER — HYDROXYZINE HYDROCHLORIDE 25 MG/1
25 TABLET, FILM COATED ORAL EVERY 6 HOURS
Qty: 12 TABLET | Refills: 0 | Status: SHIPPED | OUTPATIENT
Start: 2023-04-29

## 2023-04-29 NOTE — DISCHARGE INSTRUCTIONS
DISCHARGE INSTRUCTIONS:    FOLLOW UP WITH YOUR PRIMARY CARE PROVIDER OR THE [address] [place] [address]  MAKE AN APPOINTMENT TO BE SEEN  TAKE MEDICATION AS PRESCRIBED  IF RASH, SHORTNESS OF BREATH OR TROUBLE SWALLOWING, STOP TAKING THE MEDICATION AND BE SEEN  FOLLOW UP WITH THERAPIST/PSYCHIATRISTS  IF SYMPTOMS WORSEN OR NEW SYMPTOMS ARISE, RETURN TO THE ER TO BE SEEN
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
## 2023-05-13 ENCOUNTER — HOSPITAL ENCOUNTER (EMERGENCY)
Facility: HOSPITAL | Age: 38
Discharge: HOME | End: 2023-05-13
Attending: EMERGENCY MEDICINE

## 2023-05-13 VITALS
OXYGEN SATURATION: 98 % | TEMPERATURE: 98.6 F | HEART RATE: 86 BPM | SYSTOLIC BLOOD PRESSURE: 126 MMHG | RESPIRATION RATE: 18 BRPM | DIASTOLIC BLOOD PRESSURE: 66 MMHG

## 2023-05-13 DIAGNOSIS — F41.9 ANXIETY: Primary | ICD-10-CM

## 2023-05-13 RX ORDER — HYDROXYZINE HYDROCHLORIDE 25 MG/1
25 TABLET, FILM COATED ORAL EVERY 6 HOURS
Qty: 12 TABLET | Refills: 0 | Status: SHIPPED | OUTPATIENT
Start: 2023-05-13 | End: 2023-05-21 | Stop reason: SDUPTHER
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
## 2023-05-21 ENCOUNTER — HOSPITAL ENCOUNTER (EMERGENCY)
Facility: HOSPITAL | Age: 38
Discharge: HOME | End: 2023-05-21
Attending: EMERGENCY MEDICINE

## 2023-05-21 VITALS
TEMPERATURE: 97.6 F | RESPIRATION RATE: 20 BRPM | HEART RATE: 71 BPM | DIASTOLIC BLOOD PRESSURE: 80 MMHG | SYSTOLIC BLOOD PRESSURE: 125 MMHG | OXYGEN SATURATION: 98 %

## 2023-05-21 DIAGNOSIS — G89.29 CHRONIC PAIN OF RIGHT KNEE: ICD-10-CM

## 2023-05-21 DIAGNOSIS — M25.561 CHRONIC PAIN OF RIGHT KNEE: ICD-10-CM

## 2023-05-21 DIAGNOSIS — F41.9 ANXIETY: Primary | ICD-10-CM

## 2023-05-21 DIAGNOSIS — M54.50 LOW BACK PAIN: ICD-10-CM

## 2023-05-21 DIAGNOSIS — R10.9 RECURRENT ABDOMINAL PAIN: ICD-10-CM

## 2023-05-21 LAB
ATRIAL RATE: 79 BPM
P AXIS: 55 DEGREES
PR INTERVAL: 142 MS
QRS AXIS: 86 DEGREES
QRSD INTERVAL: 92 MS
QT INTERVAL: 380 MS
QTC INTERVAL: 435 MS
T WAVE AXIS: 45 DEGREES
VENTRICULAR RATE: 79 BPM

## 2023-05-21 RX ORDER — METHOCARBAMOL 750 MG/1
750 TABLET, FILM COATED ORAL 2 TIMES DAILY PRN
Qty: 20 TABLET | Refills: 0 | Status: SHIPPED | OUTPATIENT
Start: 2023-05-21

## 2023-05-21 RX ORDER — TRAMADOL HYDROCHLORIDE 50 MG/1
50 TABLET ORAL EVERY 8 HOURS PRN
Qty: 10 TABLET | Refills: 0 | Status: SHIPPED | OUTPATIENT
Start: 2023-05-21

## 2023-05-21 RX ORDER — HYDROXYZINE HYDROCHLORIDE 25 MG/1
25 TABLET, FILM COATED ORAL EVERY 6 HOURS
Qty: 12 TABLET | Refills: 0 | Status: SHIPPED | OUTPATIENT
Start: 2023-05-21

## 2023-05-21 RX ORDER — OMEPRAZOLE 40 MG/1
40 CAPSULE, DELAYED RELEASE ORAL DAILY
Qty: 30 CAPSULE | Refills: 0 | Status: SHIPPED | OUTPATIENT
Start: 2023-05-21
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
## 2023-05-22 ENCOUNTER — PATIENT OUTREACH (OUTPATIENT)
Dept: CASE MANAGEMENT | Facility: OTHER | Age: 38
End: 2023-05-22

## 2023-05-22 DIAGNOSIS — Z71.89 COMPLEX CARE COORDINATION: Primary | ICD-10-CM

## 2023-05-22 NOTE — PROGRESS NOTES
Email referral for medical HUCP  Patient will be reviewed for a care plan  Patient is referred to complex care management as a Rising Utilizer  Email referral sent today to  [facility] with request for PCP office care management

## 2023-05-23 ENCOUNTER — PATIENT OUTREACH (OUTPATIENT)
Dept: CASE MANAGEMENT | Facility: OTHER | Age: 38
End: 2023-05-23

## 2023-05-23 NOTE — PROGRESS NOTES
Email forward received from [employer] member  Patient reviewed by [facility] on 5/20/23 and it was determined a care plan is not appropriate at this time  Recommendation from committee is for patient to establish with PCP and has mental health resources  With the assistance [employer] interpretor, called placed to patient on preferred number ending [phone]  Patient's brother answered call and stated patient is working  Loud beeping / static nosies could be heard in the background making it difficult to understand patient brother  He provided this RN CM with Peter's number () stating he is working  Patient's brother then stated [employer] was nearby and was able to give his phone to [employer]  Patient informed RN YOLETTE he already made an appt with a family doctor on 6/5  He was not able state provider name and believes it is at [facility] Agent  RN CM provided [facility] number [phone]  Patient requested to be called back in the afternoon agreeing to outreach at 2 pm tomorrow  Reminder sent to self to complete outreach at designated time

## 2023-05-24 ENCOUNTER — PATIENT OUTREACH (OUTPATIENT)
Dept: CASE MANAGEMENT | Facility: OTHER | Age: 38
End: 2023-05-24

## 2023-05-24 NOTE — PROGRESS NOTES
With the assistance [employer] language line, this CM contacted the patient this afternoon at 2pm as previously requested  Unfortunately, he did not answer  A message was left with name, call back number, office hours and message encouraging return call of this RN CM  Another outreach attempt will be made within 1 week of today

## 2023-05-31 ENCOUNTER — PATIENT OUTREACH (OUTPATIENT)
Dept: CASE MANAGEMENT | Facility: OTHER | Age: 38
End: 2023-05-31

## 2023-05-31 NOTE — LETTER
Fecha: 05/31/23    Estimado/a Peter SalgadoRivera:  Mi nombre es Janessa Ramsey; Soy un administrador de atención de enfermería registrado que trabaja con [facility] y Danielle  No he podido comunicarme con usted y me gustaría programar un horario en el que pueda hablar con usted por teléfono  Mi trabajo es ayudar a los pacientes que arreguin estado recientemente en el hospital o en la christina de emergencias para que tengan acceso a la atención que necesitan  [place] incluye la coordinación de citas, brindar educación sobre condiciones médicas y Michelle Darby de ofrecer asistencia para ubicar recursos comunitarios en selin de que tenga dificultades financieras con alimentos, calefacción, vivienda y/o transporte  Por favor, llámeme al siguiente número si necesita ayuda o tiene alguna pregunta  Espero escuchar de usted Berlin Boxer, RN  [address] [phone]

## 2023-05-31 NOTE — PROGRESS NOTES
With the assistance [employer] interpretor,  this RN CM contacted the patient  2nd unsuccessful attempt at reaching patient for Complex Care management introductory / follow up call  Left name, call back number and message encouraging return call of this RN CM  Unable to reach letter sent via [employer] and hard copy placed in mail  RN CM will close Complex / Rising Utilizer episode 2 weeks from today if no response to VM or UTR letter

## 2023-06-13 ENCOUNTER — HOSPITAL ENCOUNTER (EMERGENCY)
Facility: HOSPITAL | Age: 38
Discharge: HOME | End: 2023-06-13
Attending: EMERGENCY MEDICINE
Payer: OTHER MISCELLANEOUS

## 2023-06-13 ENCOUNTER — APPOINTMENT (EMERGENCY)
Dept: RADIOLOGY | Facility: HOSPITAL | Age: 38
End: 2023-06-13
Payer: OTHER MISCELLANEOUS

## 2023-06-13 VITALS
OXYGEN SATURATION: 97 % | DIASTOLIC BLOOD PRESSURE: 75 MMHG | SYSTOLIC BLOOD PRESSURE: 109 MMHG | HEART RATE: 80 BPM | TEMPERATURE: 98 F | RESPIRATION RATE: 18 BRPM

## 2023-06-13 DIAGNOSIS — M25.571 ACUTE RIGHT ANKLE PAIN: Primary | ICD-10-CM

## 2023-06-13 PROCEDURE — 73610 X-RAY EXAM OF ANKLE: CPT

## 2023-06-13 RX ORDER — ACETAMINOPHEN 325 MG/1
650 TABLET ORAL ONCE
Status: COMPLETED | OUTPATIENT
Start: 2023-06-13 | End: 2023-06-13

## 2023-06-13 RX ADMIN — ACETAMINOPHEN 650 MG: 325 TABLET ORAL at 13:36
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
## 2023-06-13 NOTE — Clinical Note
Angela Quintonmaria t was seen and treated in our emergency department on 6/13/2023  Diagnosis:     Arlette uL  may return to work on return date  He may return on this date: 06/14/2023         If you have any questions or concerns, please don't hesitate to call        Sherine Bello PA-C    ______________________________           _______________          _______________  Hospital Representative                              Date                                Time

## 2023-06-14 ENCOUNTER — APPOINTMENT (OUTPATIENT)
Dept: URGENT CARE | Facility: MEDICAL CENTER | Age: 38
End: 2023-06-14
Payer: OTHER MISCELLANEOUS

## 2023-06-14 ENCOUNTER — PATIENT OUTREACH (OUTPATIENT)
Dept: CASE MANAGEMENT | Facility: OTHER | Age: 38
End: 2023-06-14

## 2023-06-14 PROCEDURE — 99213 OFFICE O/P EST LOW 20 MIN: CPT | Performed by: ORTHOPAEDIC SURGERY

## 2023-06-14 NOTE — PROGRESS NOTES
"In basket ADT notifications received 6/13 for patient being treated and released from [address] [place] [address] Emergency Department  Davina Jara presented with right ankle pain sustained from work injury  Imaging of right ankle did not yield any acute findings  Utilization / care management follow up call placed to patient today  RN CM introduced self and reason for call was to assess patient's general wellbeing or for any assistance needed with follow-up care, medication costs or financial difficulty  Davina Jara states he did not receive the UTR letter mailed by this RN CM previously  Patient was working at time of call and loud machinery could be heard in the background  He reports some swelling to his right ankle and mild pain  He states he was told to take tylenol for pain  He states he has Tramadol at home that he takes for an old knee injury  Appointment details reviewed with patient for this Friday  He replied \"I think I can make it\" and requested he be texted the information  He was informed this RN CM does not have texting capabilities and offered to email for which he agreed  He confirmed his email address as [email or web address]  Patient consented to future outreach and complex care management services  He is agreeable to outreach again from this care manager 2 weeks from today        Email sent to Davina Jara with orthopedic appt details and [facility] [address] contact info should he wish to establish with PCP           "

## 2023-06-16 ENCOUNTER — OFFICE VISIT (OUTPATIENT)
Dept: OBGYN CLINIC | Facility: MEDICAL CENTER | Age: 38
End: 2023-06-16
Payer: COMMERCIAL

## 2023-06-16 VITALS
DIASTOLIC BLOOD PRESSURE: 74 MMHG | WEIGHT: 187 LBS | HEART RATE: 83 BPM | HEIGHT: 71 IN | BODY MASS INDEX: 26.18 KG/M2 | SYSTOLIC BLOOD PRESSURE: 109 MMHG

## 2023-06-16 DIAGNOSIS — S83.241A OTHER TEAR OF MEDIAL MENISCUS, CURRENT INJURY, RIGHT KNEE, INITIAL ENCOUNTER: Primary | ICD-10-CM

## 2023-06-16 PROCEDURE — 99214 OFFICE O/P EST MOD 30 MIN: CPT | Performed by: ORTHOPAEDIC SURGERY

## 2023-06-16 RX ORDER — FLUOXETINE HYDROCHLORIDE 20 MG/1
CAPSULE ORAL
COMMUNITY
Start: 2023-06-05
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
## 2023-06-28 ENCOUNTER — PATIENT OUTREACH (OUTPATIENT)
Dept: CASE MANAGEMENT | Facility: OTHER | Age: 38
End: 2023-06-28

## 2023-06-28 NOTE — PROGRESS NOTES
Reminder received for chart review / patient outreach scheduled for today  Outside [facility] requested and refreshed  No future appts listed in chart  Unsuccessful attempt at reaching patient for Complex Care management introductory follow up call  Unfortunately, patient does not have  set up therefore no message could be left  Will send email and outreach 2 weeks from today if no response  Return call from patient stating missed call from this number  Diane Moctezuma reports that his right ankle pain is better however he continues to have right knee pain from an old injury several years ago  Patient states that he has seen the orthopedic doctor and was recommended for surgery but he remains undecided if he'd like to proceed  patient rates his right knee pain as an 8-9 out of 10 and on bad days, 10 out of 10  He describes his pain as sharp and sometimes radiating upward into his groin  he admits to intermittent swelling of the knee and states that he ran out of tramadol for pain about four weeks ago  Patient did not receive e-mail sent by this RN care manager two weeks ago and requested it be sent again  He expressed interest in locating a family doctor and would like something for pain  RN CM agreed to resend the e-mail with [facility] contact information and a message to the orthopedic provider to see if pain medication can be written  Diane Moctezuma confirmed [facility] [place]  as preferred pharmacy  Patient encouraged to note/ save this RN CM's call back number and call if no emailed received in 2-3 days  Patient stated verbal understanding  He is agreeable to continue outreach from this RN CM  Email sent to patient in [place] with [employer] office info  IB sent to [facility] with request for pain medication, if possible       Reminder sent to self to follow up on pain medication request and receipt of email to patient

## 2023-07-14 ENCOUNTER — PATIENT OUTREACH (OUTPATIENT)
Dept: CASE MANAGEMENT | Facility: OTHER | Age: 38
End: 2023-07-14

## 2023-07-14 NOTE — PROGRESS NOTES
Outside records through [facility] requested and refreshed. Chart review completed. It does not appear that pain medications were ordered as per patient request from ortho. Patient has not responded to email sent 6/28 by this RN CM. Unsuccessful attempt at reaching patient for Complex Care management follow up call. Recorded msg states "I'm sorry but the person you are calling does not have a voicemail set up. Please try your call again later."     Another outreach attempt will be made within 1 week of today.

## 2023-07-21 ENCOUNTER — PATIENT OUTREACH (OUTPATIENT)
Dept: CASE MANAGEMENT | Facility: OTHER | Age: 38
End: 2023-07-21

## 2023-07-21 NOTE — PROGRESS NOTES
To date, there has been no response to VM's left by this CM or unable to reach letter mailed to patient.   [employer] episode resolved and CM removed self from care team.

## 2023-08-30 ENCOUNTER — HOSPITAL ENCOUNTER (EMERGENCY)
Facility: HOSPITAL | Age: 38
Discharge: HOME | End: 2023-08-30
Attending: EMERGENCY MEDICINE
Payer: COMMERCIAL

## 2023-08-30 VITALS
BODY MASS INDEX: 25.1 KG/M2 | RESPIRATION RATE: 16 BRPM | OXYGEN SATURATION: 97 % | WEIGHT: 180 LBS | DIASTOLIC BLOOD PRESSURE: 62 MMHG | TEMPERATURE: 97.6 F | SYSTOLIC BLOOD PRESSURE: 114 MMHG | HEART RATE: 88 BPM

## 2023-08-30 DIAGNOSIS — H00.019 STYE: Primary | ICD-10-CM

## 2023-08-30 PROCEDURE — 99284 EMERGENCY DEPT VISIT MOD MDM: CPT

## 2023-08-30 PROCEDURE — 99282 EMERGENCY DEPT VISIT SF MDM: CPT

## 2023-08-30 RX ORDER — ERYTHROMYCIN 5 MG/G
OINTMENT OPHTHALMIC
Qty: 1 G | Refills: 0 | Status: SHIPPED | OUTPATIENT
Start: 2023-08-30
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
## 2023-12-10 ENCOUNTER — HOSPITAL ENCOUNTER (EMERGENCY)
Facility: HOSPITAL | Age: 38
Discharge: HOME | End: 2023-12-10
Attending: EMERGENCY MEDICINE | Admitting: EMERGENCY MEDICINE

## 2023-12-10 VITALS
DIASTOLIC BLOOD PRESSURE: 69 MMHG | OXYGEN SATURATION: 97 % | WEIGHT: 197.09 LBS | TEMPERATURE: 98.4 F | RESPIRATION RATE: 16 BRPM | BODY MASS INDEX: 27.49 KG/M2 | SYSTOLIC BLOOD PRESSURE: 113 MMHG | HEART RATE: 87 BPM

## 2023-12-10 DIAGNOSIS — R68.89 FLU-LIKE SYMPTOMS: Primary | ICD-10-CM

## 2023-12-10 PROCEDURE — 99284 EMERGENCY DEPT VISIT MOD MDM: CPT | Performed by: EMERGENCY MEDICINE

## 2023-12-10 PROCEDURE — 96372 THER/PROPH/DIAG INJ SC/IM: CPT

## 2023-12-10 PROCEDURE — 99282 EMERGENCY DEPT VISIT SF MDM: CPT

## 2023-12-10 RX ORDER — NAPROXEN 375 MG/1
375 TABLET ORAL 2 TIMES DAILY WITH MEALS
Qty: 10 TABLET | Refills: 0 | Status: SHIPPED | OUTPATIENT
Start: 2023-12-10

## 2023-12-10 RX ORDER — KETOROLAC TROMETHAMINE 30 MG/ML
30 INJECTION, SOLUTION INTRAMUSCULAR; INTRAVENOUS ONCE
Status: COMPLETED | OUTPATIENT
Start: 2023-12-10 | End: 2023-12-10

## 2023-12-10 RX ADMIN — KETOROLAC TROMETHAMINE 30 MG: 30 INJECTION, SOLUTION INTRAMUSCULAR; INTRAVENOUS at 07:08

## 2023-12-10 RX ADMIN — DEXAMETHASONE SODIUM PHOSPHATE 10 MG: 10 INJECTION, SOLUTION INTRAMUSCULAR; INTRAVENOUS at 07:08

## 2023-12-10 NOTE — ED NOTES
D/c instructions reviewed, pt verbalized understanding and has no further questions at this time. Pt ambulatory off unit with steady gait.      Demario Miller RN  12/10/23 9713

## 2023-12-10 NOTE — Clinical Note
Omarminerva Hawkins was seen and treated in our emergency department on 12/10/2023. Diagnosis:     Jaunita Habermann  may return to work on return date. He may return on this date: 12/12/2023         If you have any questions or concerns, please don't hesitate to call.       Blanca Pacheco MD    ______________________________           _______________          _______________  Hospital Representative                              Date                                Time
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
## 2023-12-12 ENCOUNTER — HOSPITAL ENCOUNTER (EMERGENCY)
Facility: HOSPITAL | Age: 38
Discharge: HOME | End: 2023-12-12
Admitting: EMERGENCY MEDICINE

## 2023-12-12 ENCOUNTER — APPOINTMENT (EMERGENCY)
Dept: RADIOLOGY | Facility: HOSPITAL | Age: 38
End: 2023-12-12

## 2023-12-12 VITALS
DIASTOLIC BLOOD PRESSURE: 66 MMHG | BODY MASS INDEX: 28.77 KG/M2 | OXYGEN SATURATION: 97 % | WEIGHT: 205.47 LBS | RESPIRATION RATE: 18 BRPM | TEMPERATURE: 98.2 F | HEART RATE: 82 BPM | HEIGHT: 71 IN | SYSTOLIC BLOOD PRESSURE: 113 MMHG

## 2023-12-12 DIAGNOSIS — B34.9 VIRAL SYNDROME: Primary | ICD-10-CM

## 2023-12-12 LAB
FLUAV RNA RESP QL NAA+PROBE: NEGATIVE
FLUBV RNA RESP QL NAA+PROBE: NEGATIVE
RSV RNA RESP QL NAA+PROBE: NEGATIVE
S PYO DNA THROAT QL NAA+PROBE: NOT DETECTED
SARS-COV-2 RNA RESP QL NAA+PROBE: POSITIVE

## 2023-12-12 PROCEDURE — 99284 EMERGENCY DEPT VISIT MOD MDM: CPT

## 2023-12-12 PROCEDURE — 99283 EMERGENCY DEPT VISIT LOW MDM: CPT

## 2023-12-12 PROCEDURE — 71046 X-RAY EXAM CHEST 2 VIEWS: CPT

## 2023-12-12 PROCEDURE — 87651 STREP A DNA AMP PROBE: CPT

## 2023-12-12 PROCEDURE — 0241U HB NFCT DS VIR RESP RNA 4 TRGT: CPT

## 2023-12-12 NOTE — Clinical Note
Peter Ribeiro was seen and treated in our emergency department on 12/12/2023.                Diagnosis:     Peter  may return to work on return date.    He may return on this date: 12/13/2023         If you have any questions or concerns, please don't hesitate to call.      Liseth Rowan PA-C    ______________________________           _______________          _______________  Hospital Representative                              Date                                Time
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
## 2023-12-12 NOTE — DISCHARGE INSTRUCTIONS
We will call you with any positive results     You can have fever for 3-5 days with a viral illness and then any additional symptoms that develop (congestion,cough, nausea, diarrhea) can last for another 7 days.      Alternate with Motrin and Tylenol every 3 hours for sore throat, body aches     Mucinex (guaifenesin) helps to clear mucous. Delsym (dextromethorphan) is a cough suppressant.
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
## 2024-03-26 ENCOUNTER — APPOINTMENT (EMERGENCY)
Dept: RADIOLOGY | Facility: HOSPITAL | Age: 39
End: 2024-03-26

## 2024-03-26 ENCOUNTER — HOSPITAL ENCOUNTER (EMERGENCY)
Facility: HOSPITAL | Age: 39
Discharge: HOME | End: 2024-03-26
Attending: EMERGENCY MEDICINE

## 2024-03-26 VITALS
DIASTOLIC BLOOD PRESSURE: 61 MMHG | OXYGEN SATURATION: 97 % | BODY MASS INDEX: 28.69 KG/M2 | SYSTOLIC BLOOD PRESSURE: 116 MMHG | HEART RATE: 70 BPM | TEMPERATURE: 98 F | WEIGHT: 205.69 LBS | RESPIRATION RATE: 18 BRPM

## 2024-03-26 DIAGNOSIS — M54.16 ACUTE LEFT LUMBAR RADICULOPATHY: Primary | ICD-10-CM

## 2024-03-26 PROCEDURE — 99283 EMERGENCY DEPT VISIT LOW MDM: CPT

## 2024-03-26 PROCEDURE — 96372 THER/PROPH/DIAG INJ SC/IM: CPT

## 2024-03-26 PROCEDURE — 99284 EMERGENCY DEPT VISIT MOD MDM: CPT | Performed by: EMERGENCY MEDICINE

## 2024-03-26 PROCEDURE — 72100 X-RAY EXAM L-S SPINE 2/3 VWS: CPT

## 2024-03-26 RX ORDER — NAPROXEN 500 MG/1
500 TABLET ORAL 2 TIMES DAILY WITH MEALS
Qty: 20 TABLET | Refills: 0 | Status: SHIPPED | OUTPATIENT
Start: 2024-03-26

## 2024-03-26 RX ORDER — LIDOCAINE 50 MG/G
1 PATCH TOPICAL DAILY
Qty: 10 PATCH | Refills: 0 | Status: SHIPPED | OUTPATIENT
Start: 2024-03-26

## 2024-03-26 RX ORDER — KETOROLAC TROMETHAMINE 30 MG/ML
15 INJECTION, SOLUTION INTRAMUSCULAR; INTRAVENOUS ONCE
Status: COMPLETED | OUTPATIENT
Start: 2024-03-26 | End: 2024-03-26

## 2024-03-26 RX ADMIN — KETOROLAC TROMETHAMINE 15 MG: 30 INJECTION, SOLUTION INTRAMUSCULAR; INTRAVENOUS at 12:04

## 2024-03-26 NOTE — Clinical Note
Peter Ribeiro was seen and treated in our emergency department on 3/26/2024.                Diagnosis:     Peter  may return to work on return date.    He may return on this date: 03/28/2024         If you have any questions or concerns, please don't hesitate to call.      Thelma Tenorio, DO    ______________________________           _______________          _______________  Hospital Representative                              Date                                Time
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
## 2024-03-27 ENCOUNTER — TELEPHONE (OUTPATIENT)
Dept: PHYSICAL THERAPY | Facility: OTHER | Age: 39
End: 2024-03-27

## 2024-03-27 NOTE — TELEPHONE ENCOUNTER
Call placed to the patient per [facility] referral.    V/M left for patient to call back. Phone number and hours of business provided.    This is the 1st attempt to reach the patient. Will defer referral per protocol.    H . I ?

## 2024-09-19 ENCOUNTER — HOSPITAL ENCOUNTER (EMERGENCY)
Facility: HOSPITAL | Age: 39
Discharge: HOME | End: 2024-09-19
Attending: EMERGENCY MEDICINE | Admitting: EMERGENCY MEDICINE

## 2024-09-19 ENCOUNTER — APPOINTMENT (EMERGENCY)
Dept: CT IMAGING | Facility: HOSPITAL | Age: 39
End: 2024-09-19

## 2024-09-19 VITALS
WEIGHT: 204.37 LBS | TEMPERATURE: 97.7 F | BODY MASS INDEX: 28.5 KG/M2 | DIASTOLIC BLOOD PRESSURE: 57 MMHG | OXYGEN SATURATION: 98 % | SYSTOLIC BLOOD PRESSURE: 100 MMHG | RESPIRATION RATE: 16 BRPM | HEART RATE: 79 BPM

## 2024-09-19 DIAGNOSIS — R10.9 RIGHT FLANK PAIN: Primary | ICD-10-CM

## 2024-09-19 LAB
ANION GAP SERPL CALCULATED.3IONS-SCNC: 5 MMOL/L (ref 4–13)
BACTERIA UR QL AUTO: ABNORMAL /HPF
BASOPHILS # BLD AUTO: 0.09 THOUSANDS/ΜL (ref 0–0.1)
BASOPHILS NFR BLD AUTO: 1 % (ref 0–1)
BILIRUB UR QL STRIP: NEGATIVE
BUN SERPL-MCNC: 15 MG/DL (ref 5–25)
CALCIUM SERPL-MCNC: 9 MG/DL (ref 8.4–10.2)
CHLORIDE SERPL-SCNC: 108 MMOL/L (ref 96–108)
CLARITY UR: CLEAR
CO2 SERPL-SCNC: 26 MMOL/L (ref 21–32)
COLOR UR: YELLOW
CREAT SERPL-MCNC: 0.61 MG/DL (ref 0.6–1.3)
EOSINOPHIL # BLD AUTO: 0.56 THOUSAND/ΜL (ref 0–0.61)
EOSINOPHIL NFR BLD AUTO: 6 % (ref 0–6)
ERYTHROCYTE [DISTWIDTH] IN BLOOD BY AUTOMATED COUNT: 13.1 % (ref 11.6–15.1)
GFR SERPL CREATININE-BSD FRML MDRD: 125 ML/MIN/1.73SQ M
GLUCOSE SERPL-MCNC: 99 MG/DL (ref 65–140)
GLUCOSE UR STRIP-MCNC: NEGATIVE MG/DL
HCT VFR BLD AUTO: 39.3 % (ref 36.5–49.3)
HGB BLD-MCNC: 13.2 G/DL (ref 12–17)
HGB UR QL STRIP.AUTO: ABNORMAL
IMM GRANULOCYTES # BLD AUTO: 0.04 THOUSAND/UL (ref 0–0.2)
IMM GRANULOCYTES NFR BLD AUTO: 0 % (ref 0–2)
KETONES UR STRIP-MCNC: ABNORMAL MG/DL
LEUKOCYTE ESTERASE UR QL STRIP: NEGATIVE
LYMPHOCYTES # BLD AUTO: 3.05 THOUSANDS/ΜL (ref 0.6–4.47)
LYMPHOCYTES NFR BLD AUTO: 32 % (ref 14–44)
MCH RBC QN AUTO: 28.8 PG (ref 26.8–34.3)
MCHC RBC AUTO-ENTMCNC: 33.6 G/DL (ref 31.4–37.4)
MCV RBC AUTO: 86 FL (ref 82–98)
MONOCYTES # BLD AUTO: 0.76 THOUSAND/ΜL (ref 0.17–1.22)
MONOCYTES NFR BLD AUTO: 8 % (ref 4–12)
MUCOUS THREADS UR QL AUTO: ABNORMAL
NEUTROPHILS # BLD AUTO: 5.15 THOUSANDS/ΜL (ref 1.85–7.62)
NEUTS SEG NFR BLD AUTO: 53 % (ref 43–75)
NITRITE UR QL STRIP: NEGATIVE
NON-SQ EPI CELLS URNS QL MICRO: ABNORMAL /HPF
NRBC BLD AUTO-RTO: 0 /100 WBCS
PH UR STRIP.AUTO: 6.5 [PH] (ref 4.5–8)
PLATELET # BLD AUTO: 275 THOUSANDS/UL (ref 149–390)
PMV BLD AUTO: 10.7 FL (ref 8.9–12.7)
POTASSIUM SERPL-SCNC: 3.8 MMOL/L (ref 3.5–5.3)
PROT UR STRIP-MCNC: NEGATIVE MG/DL
RBC # BLD AUTO: 4.58 MILLION/UL (ref 3.88–5.62)
RBC #/AREA URNS AUTO: ABNORMAL /HPF
SODIUM SERPL-SCNC: 139 MMOL/L (ref 135–147)
SP GR UR STRIP.AUTO: >=1.03 (ref 1–1.03)
UROBILINOGEN UR QL STRIP.AUTO: 1 E.U./DL
WBC # BLD AUTO: 9.65 THOUSAND/UL (ref 4.31–10.16)
WBC #/AREA URNS AUTO: ABNORMAL /HPF

## 2024-09-19 PROCEDURE — 81001 URINALYSIS AUTO W/SCOPE: CPT

## 2024-09-19 PROCEDURE — 80048 BASIC METABOLIC PNL TOTAL CA: CPT | Performed by: EMERGENCY MEDICINE

## 2024-09-19 PROCEDURE — 99284 EMERGENCY DEPT VISIT MOD MDM: CPT | Performed by: EMERGENCY MEDICINE

## 2024-09-19 PROCEDURE — 85025 COMPLETE CBC W/AUTO DIFF WBC: CPT | Performed by: EMERGENCY MEDICINE

## 2024-09-19 PROCEDURE — 74176 CT ABD & PELVIS W/O CONTRAST: CPT

## 2024-09-19 PROCEDURE — 96374 THER/PROPH/DIAG INJ IV PUSH: CPT

## 2024-09-19 PROCEDURE — 36415 COLL VENOUS BLD VENIPUNCTURE: CPT | Performed by: EMERGENCY MEDICINE

## 2024-09-19 PROCEDURE — 99284 EMERGENCY DEPT VISIT MOD MDM: CPT

## 2024-09-19 RX ORDER — KETOROLAC TROMETHAMINE 30 MG/ML
15 INJECTION, SOLUTION INTRAMUSCULAR; INTRAVENOUS ONCE
Status: COMPLETED | OUTPATIENT
Start: 2024-09-19 | End: 2024-09-19

## 2024-09-19 RX ORDER — METHOCARBAMOL 500 MG/1
500 TABLET, FILM COATED ORAL ONCE
Status: COMPLETED | OUTPATIENT
Start: 2024-09-19 | End: 2024-09-19

## 2024-09-19 RX ORDER — METHOCARBAMOL 500 MG/1
500 TABLET, FILM COATED ORAL EVERY 8 HOURS PRN
Qty: 15 TABLET | Refills: 0 | Status: SHIPPED | OUTPATIENT
Start: 2024-09-19

## 2024-09-19 RX ORDER — LIDOCAINE 50 MG/G
1 PATCH TOPICAL DAILY
Qty: 5 PATCH | Refills: 0 | Status: SHIPPED | OUTPATIENT
Start: 2024-09-19

## 2024-09-19 RX ORDER — LIDOCAINE 50 MG/G
1 PATCH TOPICAL ONCE
Status: DISCONTINUED | OUTPATIENT
Start: 2024-09-19 | End: 2024-09-20 | Stop reason: HOSPADM

## 2024-09-19 RX ADMIN — LIDOCAINE 1 PATCH: 700 PATCH TOPICAL at 20:07

## 2024-09-19 RX ADMIN — KETOROLAC TROMETHAMINE 15 MG: 30 INJECTION, SOLUTION INTRAMUSCULAR; INTRAVENOUS at 20:05

## 2024-09-19 RX ADMIN — METHOCARBAMOL 500 MG: 500 TABLET ORAL at 20:05
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
## 2024-09-20 NOTE — DISCHARGE INSTRUCTIONS
Pain control regimen:  1. ) Tylenol (Acetaminophen) Dosing: Take EITHER three tablets of regular strength (325 mg) or two tablets of extra strength (500 mg) every 8 hours around the clock  2.) Robaxin, taking one tablet every 8 hours as needed.  This is a muscle relaxer and can make you drowsy.  3.) Lidoderm patch, apply one patch to the area for 12 hours then remove for 12 hours.  If your insurance doesn't cover the patches, you can purchase SalonPas patches over the counter.    Follow up with your family doctor if symptoms persist over the next week.

## 2025-02-03 ENCOUNTER — APPOINTMENT (OUTPATIENT)
Dept: RADIOLOGY | Age: 40
End: 2025-02-03
Payer: OTHER MISCELLANEOUS

## 2025-02-03 ENCOUNTER — OFFICE VISIT (OUTPATIENT)
Dept: URGENT CARE | Age: 40
End: 2025-02-03
Payer: OTHER MISCELLANEOUS

## 2025-02-03 DIAGNOSIS — M79.602 LEFT ARM PAIN: ICD-10-CM

## 2025-02-03 DIAGNOSIS — M79.602 LEFT ARM PAIN: Primary | ICD-10-CM

## 2025-02-03 PROCEDURE — G0382 LEV 3 HOSP TYPE B ED VISIT: HCPCS | Performed by: EMERGENCY MEDICINE

## 2025-02-03 PROCEDURE — 73060 X-RAY EXAM OF HUMERUS: CPT

## 2025-02-03 PROCEDURE — 99283 EMERGENCY DEPT VISIT LOW MDM: CPT | Performed by: EMERGENCY MEDICINE

## 2025-02-03 PROCEDURE — 73080 X-RAY EXAM OF ELBOW: CPT

## 2025-03-13 ENCOUNTER — APPOINTMENT (EMERGENCY)
Dept: RADIOLOGY | Facility: HOSPITAL | Age: 40
End: 2025-03-13

## 2025-03-13 ENCOUNTER — HOSPITAL ENCOUNTER (EMERGENCY)
Facility: HOSPITAL | Age: 40
Discharge: HOME | End: 2025-03-13
Attending: EMERGENCY MEDICINE

## 2025-03-13 VITALS
RESPIRATION RATE: 17 BRPM | HEART RATE: 87 BPM | SYSTOLIC BLOOD PRESSURE: 112 MMHG | OXYGEN SATURATION: 99 % | DIASTOLIC BLOOD PRESSURE: 75 MMHG

## 2025-03-13 DIAGNOSIS — S16.1XXA STRAIN OF NECK MUSCLE, INITIAL ENCOUNTER: Primary | ICD-10-CM

## 2025-03-13 PROCEDURE — 96372 THER/PROPH/DIAG INJ SC/IM: CPT

## 2025-03-13 PROCEDURE — 99283 EMERGENCY DEPT VISIT LOW MDM: CPT

## 2025-03-13 PROCEDURE — 72040 X-RAY EXAM NECK SPINE 2-3 VW: CPT

## 2025-03-13 PROCEDURE — 99284 EMERGENCY DEPT VISIT MOD MDM: CPT | Performed by: EMERGENCY MEDICINE

## 2025-03-13 RX ORDER — NAPROXEN 500 MG/1
500 TABLET ORAL 2 TIMES DAILY WITH MEALS
Qty: 20 TABLET | Refills: 0 | Status: SHIPPED | OUTPATIENT
Start: 2025-03-13 | End: 2025-03-23

## 2025-03-13 RX ORDER — METHOCARBAMOL 500 MG/1
500 TABLET, FILM COATED ORAL 2 TIMES DAILY
Qty: 20 TABLET | Refills: 0 | Status: SHIPPED | OUTPATIENT
Start: 2025-03-13

## 2025-03-13 RX ORDER — KETOROLAC TROMETHAMINE 30 MG/ML
30 INJECTION, SOLUTION INTRAMUSCULAR; INTRAVENOUS ONCE
Status: COMPLETED | OUTPATIENT
Start: 2025-03-13 | End: 2025-03-13

## 2025-03-13 RX ORDER — KETOROLAC TROMETHAMINE 30 MG/ML
30 INJECTION, SOLUTION INTRAMUSCULAR; INTRAVENOUS ONCE
Status: DISCONTINUED | OUTPATIENT
Start: 2025-03-13 | End: 2025-03-13

## 2025-03-13 RX ADMIN — KETOROLAC TROMETHAMINE 30 MG: 30 INJECTION, SOLUTION INTRAMUSCULAR; INTRAVENOUS at 10:57
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
## 2025-03-13 NOTE — Clinical Note
Peter Ribeiro was seen and treated in our emergency department on 3/13/2025.    No restrictions            Diagnosis:     Peter  may return to work on return date.    He may return on this date: 03/15/2025         If you have any questions or concerns, please don't hesitate to call.      Ranulfo Rocha MD    ______________________________           _______________          _______________  Hospital Representative                              Date                                Time

## 2025-03-13 NOTE — DISCHARGE INSTRUCTIONS
Take the naprosyn twice daily for the next 10 days.    Use the Robaxin as needed for muscle spasm.    Use an electric heating pad over your sore muscles, 4-5 times daily, 20 minutes each time.    Make sure to drink plenty of fluids.

## 2025-07-16 ENCOUNTER — HOSPITAL ENCOUNTER (EMERGENCY)
Facility: HOSPITAL | Age: 40
Discharge: HOME | End: 2025-07-16
Attending: EMERGENCY MEDICINE

## 2025-07-16 ENCOUNTER — APPOINTMENT (EMERGENCY)
Dept: RADIOLOGY | Facility: HOSPITAL | Age: 40
End: 2025-07-16

## 2025-07-16 VITALS
DIASTOLIC BLOOD PRESSURE: 77 MMHG | RESPIRATION RATE: 19 BRPM | WEIGHT: 208.11 LBS | TEMPERATURE: 98.4 F | SYSTOLIC BLOOD PRESSURE: 118 MMHG | HEART RATE: 62 BPM | BODY MASS INDEX: 29.03 KG/M2 | OXYGEN SATURATION: 100 %

## 2025-07-16 DIAGNOSIS — R07.9 CHEST PAIN WITH LOW RISK OF ACUTE CORONARY SYNDROME: Primary | ICD-10-CM

## 2025-07-16 DIAGNOSIS — R00.2 PALPITATIONS: ICD-10-CM

## 2025-07-16 LAB
2HR DELTA HS TROPONIN: >1 NG/L
ALBUMIN SERPL BCG-MCNC: 4.1 G/DL (ref 3.5–5)
ALP SERPL-CCNC: 63 U/L (ref 34–104)
ALT SERPL W P-5'-P-CCNC: 11 U/L (ref 7–52)
ANION GAP SERPL CALCULATED.3IONS-SCNC: 6 MMOL/L (ref 4–13)
AST SERPL W P-5'-P-CCNC: 15 U/L (ref 13–39)
ATRIAL RATE: 61 BPM
ATRIAL RATE: 61 BPM
BASOPHILS # BLD AUTO: 0.05 THOUSANDS/ÂΜL (ref 0–0.1)
BASOPHILS NFR BLD AUTO: 1 % (ref 0–1)
BILIRUB SERPL-MCNC: 0.41 MG/DL (ref 0.2–1)
BNP SERPL-MCNC: 17 PG/ML (ref 0–100)
BUN SERPL-MCNC: 12 MG/DL (ref 5–25)
CALCIUM SERPL-MCNC: 9 MG/DL (ref 8.4–10.2)
CARDIAC TROPONIN I PNL SERPL HS: 3 NG/L (ref ?–50)
CARDIAC TROPONIN I PNL SERPL HS: <2 NG/L (ref ?–50)
CHLORIDE SERPL-SCNC: 109 MMOL/L (ref 96–108)
CO2 SERPL-SCNC: 25 MMOL/L (ref 21–32)
CREAT SERPL-MCNC: 0.71 MG/DL (ref 0.6–1.3)
EOSINOPHIL # BLD AUTO: 0.15 THOUSAND/ÂΜL (ref 0–0.61)
EOSINOPHIL NFR BLD AUTO: 2 % (ref 0–6)
ERYTHROCYTE [DISTWIDTH] IN BLOOD BY AUTOMATED COUNT: 12.9 % (ref 11.6–15.1)
GFR SERPL CREATININE-BSD FRML MDRD: 117 ML/MIN/1.73SQ M
GLUCOSE SERPL-MCNC: 101 MG/DL (ref 65–140)
HCT VFR BLD AUTO: 38.7 % (ref 36.5–49.3)
HGB BLD-MCNC: 13 G/DL (ref 12–17)
IMM GRANULOCYTES # BLD AUTO: 0.03 THOUSAND/UL (ref 0–0.2)
IMM GRANULOCYTES NFR BLD AUTO: 0 % (ref 0–2)
LYMPHOCYTES # BLD AUTO: 2.2 THOUSANDS/ÂΜL (ref 0.6–4.47)
LYMPHOCYTES NFR BLD AUTO: 24 % (ref 14–44)
MCH RBC QN AUTO: 28.9 PG (ref 26.8–34.3)
MCHC RBC AUTO-ENTMCNC: 33.6 G/DL (ref 31.4–37.4)
MCV RBC AUTO: 86 FL (ref 82–98)
MONOCYTES # BLD AUTO: 0.73 THOUSAND/ÂΜL (ref 0.17–1.22)
MONOCYTES NFR BLD AUTO: 8 % (ref 4–12)
NEUTROPHILS # BLD AUTO: 6.09 THOUSANDS/ÂΜL (ref 1.85–7.62)
NEUTS SEG NFR BLD AUTO: 65 % (ref 43–75)
NRBC BLD AUTO-RTO: 0 /100 WBCS
P AXIS: 23 DEGREES
P AXIS: 29 DEGREES
PLATELET # BLD AUTO: 258 THOUSANDS/UL (ref 149–390)
PMV BLD AUTO: 10.7 FL (ref 8.9–12.7)
POTASSIUM SERPL-SCNC: 3.6 MMOL/L (ref 3.5–5.3)
PR INTERVAL: 148 MS
PR INTERVAL: 152 MS
PROT SERPL-MCNC: 7 G/DL (ref 6.4–8.4)
QRS AXIS: 72 DEGREES
QRS AXIS: 73 DEGREES
QRSD INTERVAL: 90 MS
QRSD INTERVAL: 92 MS
QT INTERVAL: 388 MS
QT INTERVAL: 402 MS
QTC INTERVAL: 390 MS
QTC INTERVAL: 404 MS
RBC # BLD AUTO: 4.5 MILLION/UL (ref 3.88–5.62)
SODIUM SERPL-SCNC: 140 MMOL/L (ref 135–147)
T WAVE AXIS: 6 DEGREES
T WAVE AXIS: 7 DEGREES
VENTRICULAR RATE: 61 BPM
VENTRICULAR RATE: 61 BPM
WBC # BLD AUTO: 9.25 THOUSAND/UL (ref 4.31–10.16)

## 2025-07-16 PROCEDURE — 99285 EMERGENCY DEPT VISIT HI MDM: CPT | Performed by: EMERGENCY MEDICINE

## 2025-07-16 PROCEDURE — 83880 ASSAY OF NATRIURETIC PEPTIDE: CPT | Performed by: EMERGENCY MEDICINE

## 2025-07-16 PROCEDURE — 99285 EMERGENCY DEPT VISIT HI MDM: CPT

## 2025-07-16 PROCEDURE — 93005 ELECTROCARDIOGRAM TRACING: CPT

## 2025-07-16 PROCEDURE — 93010 ELECTROCARDIOGRAM REPORT: CPT | Performed by: INTERNAL MEDICINE

## 2025-07-16 PROCEDURE — 36415 COLL VENOUS BLD VENIPUNCTURE: CPT | Performed by: EMERGENCY MEDICINE

## 2025-07-16 PROCEDURE — 85025 COMPLETE CBC W/AUTO DIFF WBC: CPT | Performed by: EMERGENCY MEDICINE

## 2025-07-16 PROCEDURE — 71046 X-RAY EXAM CHEST 2 VIEWS: CPT

## 2025-07-16 PROCEDURE — 80053 COMPREHEN METABOLIC PANEL: CPT | Performed by: EMERGENCY MEDICINE

## 2025-07-16 PROCEDURE — 84484 ASSAY OF TROPONIN QUANT: CPT | Performed by: EMERGENCY MEDICINE

## 2025-07-16 RX ORDER — NAPROXEN 250 MG/1
250 TABLET ORAL
Qty: 21 TABLET | Refills: 0 | Status: SHIPPED | OUTPATIENT
Start: 2025-07-16 | End: 2025-07-23

## 2025-07-16 RX ORDER — ASPIRIN 81 MG/1
324 TABLET, CHEWABLE ORAL ONCE
Status: COMPLETED | OUTPATIENT
Start: 2025-07-16 | End: 2025-07-16

## 2025-07-16 RX ADMIN — ASPIRIN 81 MG CHEWABLE TABLET 324 MG: 81 TABLET CHEWABLE at 13:52
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
## 2025-07-16 NOTE — Clinical Note
Peter Ribeiro was seen and treated in our emergency department on 7/16/2025.                Diagnosis:     Peter  may return to work on return date.    He may return on this date: 07/18/2025         If you have any questions or concerns, please don't hesitate to call.      Abdi Lott MD    ______________________________           _______________          _______________  Hospital Representative                              Date                                Time

## 2025-07-29 ENCOUNTER — APPOINTMENT (OUTPATIENT)
Dept: LAB | Facility: MEDICAL CENTER | Age: 40
End: 2025-07-29
Attending: NURSE PRACTITIONER
Payer: OTHER MISCELLANEOUS

## 2025-07-29 ENCOUNTER — OCCMED (OUTPATIENT)
Dept: URGENT CARE | Facility: MEDICAL CENTER | Age: 40
End: 2025-07-29
Payer: OTHER MISCELLANEOUS

## 2025-07-29 DIAGNOSIS — S60.943A: Primary | ICD-10-CM
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
## 2025-07-29 DIAGNOSIS — Z77.21 CONTACT WITH POTENTIALLY HAZARDOUS BODY FLUIDS: ICD-10-CM

## 2025-07-29 LAB
ALT SERPL W P-5'-P-CCNC: 15 U/L (ref 7–52)
HBV SURFACE AB SER-ACNC: 21.7 MIU/ML
HBV SURFACE AG SER QL: NORMAL
HCV AB SER QL: NORMAL
HIV 1+2 AB+HIV1 P24 AG SERPL QL IA: NORMAL

## 2025-07-29 PROCEDURE — 99284 EMERGENCY DEPT VISIT MOD MDM: CPT | Performed by: NURSE PRACTITIONER

## 2025-07-29 PROCEDURE — G0383 LEV 4 HOSP TYPE B ED VISIT: HCPCS | Performed by: NURSE PRACTITIONER

## 2025-07-29 PROCEDURE — 87340 HEPATITIS B SURFACE AG IA: CPT

## 2025-07-29 PROCEDURE — 87389 HIV-1 AG W/HIV-1&-2 AB AG IA: CPT

## 2025-07-29 PROCEDURE — 36415 COLL VENOUS BLD VENIPUNCTURE: CPT

## 2025-07-29 PROCEDURE — 90715 TDAP VACCINE 7 YRS/> IM: CPT

## 2025-07-29 PROCEDURE — 84460 ALANINE AMINO (ALT) (SGPT): CPT

## 2025-07-29 PROCEDURE — 86803 HEPATITIS C AB TEST: CPT

## 2025-07-29 PROCEDURE — 86706 HEP B SURFACE ANTIBODY: CPT

## 2025-07-29 PROCEDURE — 90471 IMMUNIZATION ADMIN: CPT | Performed by: NURSE PRACTITIONER

## 2025-07-29 PROCEDURE — 99205 OFFICE O/P NEW HI 60 MIN: CPT

## 2025-08-04 ENCOUNTER — OCCMED (OUTPATIENT)
Dept: URGENT CARE | Facility: MEDICAL CENTER | Age: 40
End: 2025-08-04
Payer: OTHER MISCELLANEOUS

## 2025-08-04 DIAGNOSIS — S60.943D: Primary | ICD-10-CM

## 2025-08-04 PROCEDURE — 99213 OFFICE O/P EST LOW 20 MIN: CPT | Performed by: NURSE PRACTITIONER
